# Patient Record
Sex: MALE | Race: OTHER | Employment: OTHER | URBAN - METROPOLITAN AREA
[De-identification: names, ages, dates, MRNs, and addresses within clinical notes are randomized per-mention and may not be internally consistent; named-entity substitution may affect disease eponyms.]

---

## 2018-11-09 ENCOUNTER — HOSPITAL ENCOUNTER (INPATIENT)
Age: 38
LOS: 10 days | Discharge: HOME | DRG: 064 | End: 2018-11-19
Attending: EMERGENCY MEDICINE | Admitting: FAMILY MEDICINE
Payer: SELF-PAY

## 2018-11-09 ENCOUNTER — APPOINTMENT (OUTPATIENT)
Dept: GENERAL RADIOLOGY | Age: 38
DRG: 064 | End: 2018-11-09
Attending: FAMILY MEDICINE
Payer: SELF-PAY

## 2018-11-09 ENCOUNTER — APPOINTMENT (OUTPATIENT)
Dept: CT IMAGING | Age: 38
End: 2018-11-09
Attending: EMERGENCY MEDICINE
Payer: SELF-PAY

## 2018-11-09 ENCOUNTER — HOSPITAL ENCOUNTER (EMERGENCY)
Age: 38
Discharge: OTHER INSTITUTION | End: 2018-11-09
Attending: EMERGENCY MEDICINE
Payer: SELF-PAY

## 2018-11-09 VITALS
OXYGEN SATURATION: 96 % | WEIGHT: 230 LBS | HEART RATE: 75 BPM | TEMPERATURE: 98.5 F | SYSTOLIC BLOOD PRESSURE: 137 MMHG | DIASTOLIC BLOOD PRESSURE: 70 MMHG | RESPIRATION RATE: 17 BRPM

## 2018-11-09 DIAGNOSIS — I61.9 INTRAPARENCHYMAL HEMORRHAGE OF BRAIN (HCC): ICD-10-CM

## 2018-11-09 DIAGNOSIS — R41.82 ALTERED MENTAL STATUS, UNSPECIFIED ALTERED MENTAL STATUS TYPE: Primary | ICD-10-CM

## 2018-11-09 DIAGNOSIS — I61.9 CEREBRAL HEMORRHAGE (HCC): Primary | ICD-10-CM

## 2018-11-09 DIAGNOSIS — F10.939 ALCOHOL WITHDRAWAL SYNDROME WITH COMPLICATION (HCC): ICD-10-CM

## 2018-11-09 DIAGNOSIS — E87.1 HYPONATREMIA: ICD-10-CM

## 2018-11-09 LAB
ABO + RH BLD: NORMAL
ALBUMIN SERPL-MCNC: 3.4 G/DL (ref 3.5–5)
ALBUMIN/GLOB SERPL: 0.5 {RATIO} (ref 1.1–2.2)
ALP SERPL-CCNC: 132 U/L (ref 45–117)
ALT SERPL-CCNC: 33 U/L (ref 12–78)
AMMONIA PLAS-SCNC: 49 UMOL/L
AMPHET UR QL SCN: NEGATIVE
ANION GAP SERPL CALC-SCNC: 6 MMOL/L (ref 5–15)
APPEARANCE UR: ABNORMAL
APTT PPP: 29.9 SEC (ref 22.1–32)
AST SERPL-CCNC: 56 U/L (ref 15–37)
ATRIAL RATE: 72 BPM
BACTERIA URNS QL MICRO: NEGATIVE /HPF
BARBITURATES UR QL SCN: NEGATIVE
BASOPHILS # BLD: 0.1 K/UL (ref 0–0.1)
BASOPHILS NFR BLD: 1 % (ref 0–1)
BENZODIAZ UR QL: NEGATIVE
BILIRUB SERPL-MCNC: 4.3 MG/DL (ref 0.2–1)
BILIRUB UR QL CFM: NEGATIVE
BLOOD GROUP ANTIBODIES SERPL: NORMAL
BUN SERPL-MCNC: 7 MG/DL (ref 6–20)
BUN/CREAT SERPL: 10 (ref 12–20)
CALCIUM SERPL-MCNC: 8.7 MG/DL (ref 8.5–10.1)
CALCULATED P AXIS, ECG09: 54 DEGREES
CALCULATED R AXIS, ECG10: -8 DEGREES
CALCULATED T AXIS, ECG11: 6 DEGREES
CANNABINOIDS UR QL SCN: NEGATIVE
CHLORIDE SERPL-SCNC: 92 MMOL/L (ref 97–108)
CHOLEST SERPL-MCNC: 82 MG/DL
CO2 SERPL-SCNC: 26 MMOL/L (ref 21–32)
COCAINE UR QL SCN: NEGATIVE
COLOR UR: ABNORMAL
COMMENT, HOLDF: NORMAL
CREAT SERPL-MCNC: 0.73 MG/DL (ref 0.7–1.3)
DIAGNOSIS, 93000: NORMAL
DIFFERENTIAL METHOD BLD: ABNORMAL
DRUG SCRN COMMENT,DRGCM: NORMAL
EOSINOPHIL # BLD: 0 K/UL (ref 0–0.4)
EOSINOPHIL NFR BLD: 0 % (ref 0–7)
EPITH CASTS URNS QL MICRO: ABNORMAL /LPF
ERYTHROCYTE [DISTWIDTH] IN BLOOD BY AUTOMATED COUNT: 11.9 % (ref 11.5–14.5)
ETHANOL SERPL-MCNC: <10 MG/DL
GLOBULIN SER CALC-MCNC: 6.2 G/DL (ref 2–4)
GLUCOSE SERPL-MCNC: 122 MG/DL (ref 65–100)
GLUCOSE UR STRIP.AUTO-MCNC: NEGATIVE MG/DL
HCT VFR BLD AUTO: 39.9 % (ref 36.6–50.3)
HDLC SERPL-MCNC: 31 MG/DL
HDLC SERPL: 2.6 {RATIO} (ref 0–5)
HGB BLD-MCNC: 13.9 G/DL (ref 12.1–17)
HGB UR QL STRIP: ABNORMAL
IMM GRANULOCYTES # BLD: 0.1 K/UL (ref 0–0.04)
IMM GRANULOCYTES NFR BLD AUTO: 1 % (ref 0–0.5)
INR PPP: 1.3 (ref 0.9–1.1)
KETONES UR QL STRIP.AUTO: 15 MG/DL
LDLC SERPL CALC-MCNC: 39.6 MG/DL (ref 0–100)
LEUKOCYTE ESTERASE UR QL STRIP.AUTO: ABNORMAL
LIPID PROFILE,FLP: NORMAL
LYMPHOCYTES # BLD: 1.8 K/UL (ref 0.8–3.5)
LYMPHOCYTES NFR BLD: 17 % (ref 12–49)
MAGNESIUM SERPL-MCNC: 2 MG/DL (ref 1.6–2.4)
MCH RBC QN AUTO: 35.5 PG (ref 26–34)
MCHC RBC AUTO-ENTMCNC: 34.8 G/DL (ref 30–36.5)
MCV RBC AUTO: 102 FL (ref 80–99)
METHADONE UR QL: NEGATIVE
MONOCYTES # BLD: 1.6 K/UL (ref 0–1)
MONOCYTES NFR BLD: 15 % (ref 5–13)
MUCOUS THREADS URNS QL MICRO: ABNORMAL /LPF
NEUTS SEG # BLD: 6.9 K/UL (ref 1.8–8)
NEUTS SEG NFR BLD: 66 % (ref 32–75)
NITRITE UR QL STRIP.AUTO: POSITIVE
NRBC # BLD: 0 K/UL (ref 0–0.01)
NRBC BLD-RTO: 0 PER 100 WBC
OPIATES UR QL: NEGATIVE
P-R INTERVAL, ECG05: 160 MS
PCP UR QL: NEGATIVE
PH UR STRIP: 6.5 [PH] (ref 5–8)
PLATELET # BLD AUTO: 39 K/UL (ref 150–400)
POTASSIUM SERPL-SCNC: 3.8 MMOL/L (ref 3.5–5.1)
PROT SERPL-MCNC: 9.6 G/DL (ref 6.4–8.2)
PROT UR STRIP-MCNC: 300 MG/DL
PROTHROMBIN TIME: 12.8 SEC (ref 9–11.1)
Q-T INTERVAL, ECG07: 424 MS
QRS DURATION, ECG06: 114 MS
QTC CALCULATION (BEZET), ECG08: 464 MS
RBC # BLD AUTO: 3.91 M/UL (ref 4.1–5.7)
RBC #/AREA URNS HPF: ABNORMAL /HPF (ref 0–5)
RBC MORPH BLD: ABNORMAL
SAMPLES BEING HELD,HOLD: NORMAL
SODIUM SERPL-SCNC: 124 MMOL/L (ref 136–145)
SP GR UR REFRACTOMETRY: 1.02 (ref 1–1.03)
SPECIMEN EXP DATE BLD: NORMAL
THERAPEUTIC RANGE,PTTT: NORMAL SECS (ref 58–77)
TRIGL SERPL-MCNC: 57 MG/DL (ref ?–150)
UROBILINOGEN UR QL STRIP.AUTO: >8 EU/DL (ref 0.2–1)
VENTRICULAR RATE, ECG03: 72 BPM
VIT B12 SERPL-MCNC: 1694 PG/ML (ref 193–986)
VLDLC SERPL CALC-MCNC: 11.4 MG/DL
WBC # BLD AUTO: 10.5 K/UL (ref 4.1–11.1)
WBC MORPH BLD: ABNORMAL
WBC URNS QL MICRO: ABNORMAL /HPF (ref 0–4)

## 2018-11-09 PROCEDURE — 74011250636 HC RX REV CODE- 250/636: Performed by: EMERGENCY MEDICINE

## 2018-11-09 PROCEDURE — 74011000250 HC RX REV CODE- 250: Performed by: FAMILY MEDICINE

## 2018-11-09 PROCEDURE — HZ2ZZZZ DETOXIFICATION SERVICES FOR SUBSTANCE ABUSE TREATMENT: ICD-10-PCS | Performed by: HOSPITALIST

## 2018-11-09 PROCEDURE — 80307 DRUG TEST PRSMV CHEM ANLYZR: CPT

## 2018-11-09 PROCEDURE — 70450 CT HEAD/BRAIN W/O DYE: CPT

## 2018-11-09 PROCEDURE — 36430 TRANSFUSION BLD/BLD COMPNT: CPT

## 2018-11-09 PROCEDURE — 36415 COLL VENOUS BLD VENIPUNCTURE: CPT

## 2018-11-09 PROCEDURE — 74011000250 HC RX REV CODE- 250: Performed by: EMERGENCY MEDICINE

## 2018-11-09 PROCEDURE — 85730 THROMBOPLASTIN TIME PARTIAL: CPT

## 2018-11-09 PROCEDURE — 81001 URINALYSIS AUTO W/SCOPE: CPT

## 2018-11-09 PROCEDURE — 99285 EMERGENCY DEPT VISIT HI MDM: CPT

## 2018-11-09 PROCEDURE — 87040 BLOOD CULTURE FOR BACTERIA: CPT

## 2018-11-09 PROCEDURE — 65610000006 HC RM INTENSIVE CARE

## 2018-11-09 PROCEDURE — 82140 ASSAY OF AMMONIA: CPT

## 2018-11-09 PROCEDURE — 74011000258 HC RX REV CODE- 258: Performed by: NEUROLOGICAL SURGERY

## 2018-11-09 PROCEDURE — 71045 X-RAY EXAM CHEST 1 VIEW: CPT

## 2018-11-09 PROCEDURE — 80053 COMPREHEN METABOLIC PANEL: CPT

## 2018-11-09 PROCEDURE — P9035 PLATELET PHERES LEUKOREDUCED: HCPCS

## 2018-11-09 PROCEDURE — 86901 BLOOD TYPING SEROLOGIC RH(D): CPT

## 2018-11-09 PROCEDURE — 96374 THER/PROPH/DIAG INJ IV PUSH: CPT

## 2018-11-09 PROCEDURE — 74011250636 HC RX REV CODE- 250/636: Performed by: NEUROLOGICAL SURGERY

## 2018-11-09 PROCEDURE — 74011000250 HC RX REV CODE- 250: Performed by: NEUROLOGICAL SURGERY

## 2018-11-09 PROCEDURE — 82607 VITAMIN B-12: CPT

## 2018-11-09 PROCEDURE — 94761 N-INVAS EAR/PLS OXIMETRY MLT: CPT

## 2018-11-09 PROCEDURE — 96375 TX/PRO/DX INJ NEW DRUG ADDON: CPT

## 2018-11-09 PROCEDURE — 99284 EMERGENCY DEPT VISIT MOD MDM: CPT

## 2018-11-09 PROCEDURE — 93005 ELECTROCARDIOGRAM TRACING: CPT

## 2018-11-09 PROCEDURE — 74011250636 HC RX REV CODE- 250/636: Performed by: FAMILY MEDICINE

## 2018-11-09 PROCEDURE — 80061 LIPID PANEL: CPT

## 2018-11-09 PROCEDURE — 74011000258 HC RX REV CODE- 258: Performed by: FAMILY MEDICINE

## 2018-11-09 PROCEDURE — 96361 HYDRATE IV INFUSION ADD-ON: CPT

## 2018-11-09 PROCEDURE — 85025 COMPLETE CBC W/AUTO DIFF WBC: CPT

## 2018-11-09 PROCEDURE — 83735 ASSAY OF MAGNESIUM: CPT

## 2018-11-09 PROCEDURE — 85610 PROTHROMBIN TIME: CPT

## 2018-11-09 RX ORDER — LORAZEPAM 2 MG/ML
INJECTION INTRAMUSCULAR
Status: DISPENSED
Start: 2018-11-09 | End: 2018-11-10

## 2018-11-09 RX ORDER — LABETALOL HYDROCHLORIDE 5 MG/ML
20 INJECTION, SOLUTION INTRAVENOUS
Status: COMPLETED | OUTPATIENT
Start: 2018-11-09 | End: 2018-11-09

## 2018-11-09 RX ORDER — LORAZEPAM 1 MG/1
2 TABLET ORAL
Status: DISCONTINUED | OUTPATIENT
Start: 2018-11-09 | End: 2018-11-09

## 2018-11-09 RX ORDER — SODIUM CHLORIDE 9 MG/ML
250 INJECTION, SOLUTION INTRAVENOUS AS NEEDED
Status: DISCONTINUED | OUTPATIENT
Start: 2018-11-09 | End: 2018-11-19 | Stop reason: HOSPADM

## 2018-11-09 RX ORDER — ACETAMINOPHEN 325 MG/1
650 TABLET ORAL
Status: DISCONTINUED | OUTPATIENT
Start: 2018-11-09 | End: 2018-11-14

## 2018-11-09 RX ORDER — LORAZEPAM 2 MG/ML
2 INJECTION INTRAMUSCULAR
Status: DISCONTINUED | OUTPATIENT
Start: 2018-11-09 | End: 2018-11-13 | Stop reason: SDUPTHER

## 2018-11-09 RX ORDER — NALOXONE HYDROCHLORIDE 0.4 MG/ML
0.4 INJECTION, SOLUTION INTRAMUSCULAR; INTRAVENOUS; SUBCUTANEOUS AS NEEDED
Status: DISCONTINUED | OUTPATIENT
Start: 2018-11-09 | End: 2018-11-19 | Stop reason: HOSPADM

## 2018-11-09 RX ORDER — LORAZEPAM 1 MG/1
4 TABLET ORAL
Status: DISCONTINUED | OUTPATIENT
Start: 2018-11-09 | End: 2018-11-09

## 2018-11-09 RX ORDER — SODIUM CHLORIDE 9 MG/ML
75 INJECTION, SOLUTION INTRAVENOUS CONTINUOUS
Status: DISCONTINUED | OUTPATIENT
Start: 2018-11-09 | End: 2018-11-15

## 2018-11-09 RX ORDER — SODIUM CHLORIDE 0.9 % (FLUSH) 0.9 %
5 SYRINGE (ML) INJECTION EVERY 8 HOURS
Status: DISCONTINUED | OUTPATIENT
Start: 2018-11-09 | End: 2018-11-19 | Stop reason: HOSPADM

## 2018-11-09 RX ORDER — LORAZEPAM 2 MG/ML
1 INJECTION INTRAMUSCULAR
Status: DISCONTINUED | OUTPATIENT
Start: 2018-11-09 | End: 2018-11-09

## 2018-11-09 RX ORDER — ACETAMINOPHEN 650 MG/1
650 SUPPOSITORY RECTAL
Status: DISCONTINUED | OUTPATIENT
Start: 2018-11-09 | End: 2018-11-14

## 2018-11-09 RX ORDER — ONDANSETRON 2 MG/ML
4 INJECTION INTRAMUSCULAR; INTRAVENOUS
Status: DISCONTINUED | OUTPATIENT
Start: 2018-11-09 | End: 2018-11-19 | Stop reason: HOSPADM

## 2018-11-09 RX ORDER — LORAZEPAM 2 MG/ML
4 INJECTION INTRAMUSCULAR
Status: DISCONTINUED | OUTPATIENT
Start: 2018-11-09 | End: 2018-11-13

## 2018-11-09 RX ORDER — LORAZEPAM 2 MG/ML
2 INJECTION INTRAMUSCULAR
Status: COMPLETED | OUTPATIENT
Start: 2018-11-09 | End: 2018-11-09

## 2018-11-09 RX ADMIN — LORAZEPAM 4 MG: 2 INJECTION INTRAMUSCULAR; INTRAVENOUS at 19:07

## 2018-11-09 RX ADMIN — LORAZEPAM 1 MG: 2 INJECTION INTRAMUSCULAR; INTRAVENOUS at 18:08

## 2018-11-09 RX ADMIN — SODIUM CHLORIDE 0.4 MCG/KG/HR: 900 INJECTION, SOLUTION INTRAVENOUS at 19:19

## 2018-11-09 RX ADMIN — LORAZEPAM 4 MG: 2 INJECTION INTRAMUSCULAR; INTRAVENOUS at 19:39

## 2018-11-09 RX ADMIN — LORAZEPAM 2 MG: 2 INJECTION INTRAMUSCULAR; INTRAVENOUS at 15:17

## 2018-11-09 RX ADMIN — SODIUM CHLORIDE 75 ML/HR: 900 INJECTION, SOLUTION INTRAVENOUS at 17:43

## 2018-11-09 RX ADMIN — LORAZEPAM 2 MG: 2 INJECTION INTRAMUSCULAR; INTRAVENOUS at 18:44

## 2018-11-09 RX ADMIN — LABETALOL HYDROCHLORIDE 20 MG: 5 INJECTION INTRAVENOUS at 13:52

## 2018-11-09 RX ADMIN — Medication 20 ML: at 20:07

## 2018-11-09 RX ADMIN — FAMOTIDINE 20 MG: 10 INJECTION, SOLUTION INTRAVENOUS at 20:05

## 2018-11-09 RX ADMIN — SODIUM CHLORIDE 0.7 MCG/KG/HR: 900 INJECTION, SOLUTION INTRAVENOUS at 22:53

## 2018-11-09 RX ADMIN — FOLIC ACID: 5 INJECTION, SOLUTION INTRAMUSCULAR; INTRAVENOUS; SUBCUTANEOUS at 16:58

## 2018-11-09 RX ADMIN — SODIUM CHLORIDE 5 MG/HR: 900 INJECTION, SOLUTION INTRAVENOUS at 13:57

## 2018-11-09 RX ADMIN — CEFTRIAXONE 1 G: 1 INJECTION, POWDER, FOR SOLUTION INTRAMUSCULAR; INTRAVENOUS at 18:02

## 2018-11-09 RX ADMIN — SODIUM CHLORIDE 1000 ML: 900 INJECTION, SOLUTION INTRAVENOUS at 12:45

## 2018-11-09 RX ADMIN — SODIUM CHLORIDE 500 MG: 900 INJECTION, SOLUTION INTRAVENOUS at 17:35

## 2018-11-09 RX ADMIN — SODIUM CHLORIDE 1.2 MCG/KG/HR: 900 INJECTION, SOLUTION INTRAVENOUS at 21:54

## 2018-11-09 NOTE — H&P
[address]  HISTORY AND PHYSICAL Maricruz Ballesteros 
MR#: [id number] : 1980 ACCOUNT #: [de-identified] ADMIT DATE: 2018 CHIEF COMPLAINT:  Altered mental status. HISTORY OF PRESENT ILLNESS:  The patient is a 60-year-old male with a past medical history of alcohol abuse, who presents to the hospital with the above-mentioned symptoms. Patient is very confused, disoriented, not much history could be obtained from the patient. The  history was obtained primarily from the ER note as well as his sister, who is currently at the bedside. Apparently, per sister, the patient had a heavy amount of alcohol last night. This morning, it was noticed by his roommates that he was not acting right. He was confused, not able to walk, and had a fall in the shower. Post that, the patient became more confused and EMS was called, and the patient was brought to the hospital.  Patient was found to have an intracranial bleed, was requested to be admitted, and was sent to [facility] [address] [place] [address] for further management and evaluation. Per sister, the patient works as a . Neurosurgery was consulted and the patient was requested to be admitted under the hospitalist service. Currently, the patient appears jittery, does not follow much instruction, and appears to be quite tremulous. No further history could be obtained from the sister, ER note from the ER physician, or from the patient. PAST MEDICAL HISTORY:  See above. HOME MEDICATIONS:  Currently, the patient is on no home medications. SOCIAL HISTORY:  Per sister, the patient drinks heavily, especially on the weekends, drinks 4-6 beers on weekdays. No history of IV drug abuse. ALLERGIES:  NO KNOWN DRUG ALLERGIES. FAMILY HISTORY:  Could not be obtained from the patient. REVIEW OF SYSTEMS:  Could not be obtained from the patient.  
 
PHYSICAL EXAMINATION: 
 VITAL SIGNS:  Temperature 100.4, pulse 79, respiratory rate 17, blood pressure 125/85, pulse ox 96% on room air. GENERAL:  Confused, disoriented, tremulous male. HEENT:  Pupils equal and reactive to light. Dry mucous membranes. Tympanic membranes clear. NECK:  Supple. CHEST:  Clear to auscultation bilaterally. CORONARY:  S1 and S2 were heard. ABDOMEN:  Soft, nontender, nondistended. Bowel sounds are physiologic. EXTREMITIES:  No clubbing, no cyanosis, no edema. NEUROPSYCHIATRIC:  Very limited exam.  DTRs 1+/4. Patient able to move all 4, but strength could not be tested. Cranial nerves could not be tested. SKIN:  Warm. LABORATORY DATA:  White count 10.5, hemoglobin 13.9, hematocrit 39.9, MCV is 102, platelets 48,632. Large amount of blood in the urine with 5-10 WBCs and negative bacteria. INR 1.3. Sodium 124, potassium 3.8, chloride 92, bicarbonate 26, anion gap 6, glucose 122, BUN 7, creatinine 2.73, calcium 8.7, magnesium 2.0, bilirubin total 4.3, ALT 33, AST 56, alkaline phosphatase 132, ammonia 49. Urine drug screen is negative. Blood alcohol level was less than 10. CT of the head shows acute left parietal intraparenchymal hemorrhage measuring 5.1 x 3.6 x 6.2 cm . EKG shows normal sinus rhythm with sinus arrhythmia. Minimal voltage criteria for LVH. ASSESSMENT AND PLAN: 
1. Intracranial hemorrhage. The patient will be admitted to the ICU bed. The patient appears to be critically ill and with high risk for decompensation, and this was explained to the sister. We will monitor the patient. We will provide serial neuro checks. Any change in his neurological status will mandate emergent intervention. Hold anticoagulation and antiplatelet. We will repeat a CT scan in the morning. May consider getting an MRI in the morning. Neurosurgery consult has been obtained. Per Neurosurgery note, the patient is not a surgical candidate, but we will await their input.   We will provide supportive care and close monitoring. Further intervention will be per hospital course. Reassess as needed. 2.  Thrombocytopenia, most likely secondary to alcohol. We will transfuse platelets in light of intracranial hemorrhage per the guidelines and closely monitor. Repeat CBC in the morning. Further intervention will be per hospital course. 3.  Fever, unclear etiology. Patient possibly may have a urinary tract infection versus other etiology. Chest x-ray was not obtained in the ER, which has been ordered. We will start the patient empirically on Rocephin. We will get urine culture and blood cultures, and continue to closely monitor. Further intervention will be per hospital course. 4.  Elevated liver enzymes, suspect secondary to alcoholic liver disease. We will get a right upper quadrant ultrasound when the patient is stable. Further intervention will be per hospital course. Continue to closely monitor. Reassess as needed. 5.  Hyponatremia, most likely alcohol related. We will provide normal saline. Currently, the patient is n.p.o. We will repeat labs in the morning, neurovascular checks, and close monitoring. Further intervention will be per hospital course. 6.  Gastrointestinal and deep venous thrombosis prophylaxis. Patient will be on sequential compression devices. Mildred Mitchell MD MM/TAMIA 
D: 11/09/2018 17:05    
T: 11/09/2018 17:49 JOB #: [id number]
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
## 2018-11-09 NOTE — ROUTINE PROCESS
TRANSFER - OUT REPORT: 
 
Verbal report given to Charu WILLIAM(name) on Dede Joseph  being transferred to (unit) for routine progression of care Report consisted of patients Situation, Background, Assessment and  
Recommendations(SBAR). Information from the following report(s) SBAR, ED Summary, Procedure Summary, MAR and Recent Results was reviewed with the receiving nurse. Lines:  
Peripheral IV 11/09/18 Left;Upper Arm (Active) Site Assessment Clean, dry, & intact 11/9/2018  5:39 PM  
Phlebitis Assessment 0 11/9/2018  5:39 PM  
Infiltration Assessment 0 11/9/2018  5:39 PM  
Dressing Status Clean, dry, & intact 11/9/2018  5:39 PM  
   
Peripheral IV 11/09/18 Right; Lower Arm (Active) Site Assessment Clean, dry, & intact 11/9/2018  5:40 PM  
Phlebitis Assessment 0 11/9/2018  5:40 PM  
Infiltration Assessment 0 11/9/2018  5:40 PM  
Dressing Status Clean, dry, & intact 11/9/2018  5:40 PM  
   
Peripheral IV 11/09/18 Left Antecubital (Active) Site Assessment Clean, dry, & intact 11/9/2018  5:40 PM  
Phlebitis Assessment 0 11/9/2018  5:40 PM  
Infiltration Assessment 0 11/9/2018  5:40 PM  
Dressing Status Clean, dry, & intact 11/9/2018  5:40 PM  
  
 
Opportunity for questions and clarification was provided. Patient transported with: 
 Monitor Registered Nurse

## 2018-11-09 NOTE — PROGRESS NOTES
Spiritual Care Assessment/Progress Note [address] 
 
 
NAME: Nael Mckinney      MRN: [id number] AGE: 41 y.o. SEX: male Mormonism Affiliation: Anabaptist Language: Tuvaluan  
 
11/9/2018     Total Time (in minutes): 5 Spiritual Assessment begun in [facility] EMERGENCY DEPT through conversation with: 
  
    [x]Patient        [x] Family    [] Friend(s) Reason for Consult: Request by staff Spiritual beliefs: (Please include comment if needed) [x] Identifies with a bouchra tradition:     
   [] Supported by a bouchra community:        
   [] Claims no spiritual orientation:       
   [] Seeking spiritual identity:            
   [] Adheres to an individual form of spirituality:       
   [] Not able to assess:                   
 
    
Identified resources for coping:  
   [x] Prayer                           
   [] Music                  [] Guided Imagery [x] Family/friends                 [] Pet visits [] Devotional reading                         [] Unknown 
   [] Other:                                        
 
 
Interventions offered during this visit: (See comments for more details) Patient Interventions: Prayer (actual) Family/Friend(s): Affirmation of emotions/emotional suffering, Affirmation of bouchra, Catharsis/review of pertinent events in supportive environment, Iconic (affirming the presence of God/Higher Power), Prayer (actual), Prayer (assurance of) Plan of Care: 
 
 [x] Support spiritual and/or cultural needs  
 [] Support AMD and/or advance care planning process    
 [] Support grieving process 
 [] Coordinate Rites and/or Rituals  
 [] Coordination with community clergy [] No spiritual needs identified at this time 
 [] Detailed Plan of Care below (See Comments)  [] Make referral to Music Therapy 
[] Make referral to Pet Therapy    
[] Make referral to Addiction services 
[] Make referral to [facility] [] Make referral to Spiritual Care Partner 
[] No future visits requested       
[] Follow up visits as needed Comments:  is visiting in response to staff request as pt is being prepped to transfer to [facility].  met with pt and family providing support and prayer. Chaplain Landon Diego M.Div, M.S, [facility] Spiritual Care available at [phone])

## 2018-11-09 NOTE — ED TRIAGE NOTES
uTrail me # [id number] Roommate called sister and stated that the patient was not acting right this am. Pt with H/O ETOH abuse. Pt having trouble walking. Scattered bruising noted. Last ETOH intake unknown.

## 2018-11-09 NOTE — ED PROVIDER NOTES
41 y.o. male with past medical history significant for etoh abuse, who presents from EMS as a transfer for evaluation of fall. Pt has onset this morning of an unwitnessed GLF in the shower. Following, his sister observed him as confused and disoriented. She reports that he \"tried to go outside w/o clothes on. \" Pt was taken to [facility] ED for evaluation: head CT yielded parietal bleeding. S/p arrival to [facility], pt is intoxicated; last drink this morning. There are no other acute medical concerns at this time. Social hx: etoh abuse. Full history, physical exam, and ROS unable to be obtained due to:  AMS. Note written by Kraft Handler, as dictated by An Kruger MD 3:02 PM 
 
 
The history is provided by the patient, a relative and the EMS personnel. History limited by: AMS. No  was used. No past medical history on file. No past surgical history on file. No family history on file. Social History Socioeconomic History  Marital status: SINGLE Spouse name: Not on file  Number of children: Not on file  Years of education: Not on file  Highest education level: Not on file Social Needs  Financial resource strain: Not on file  Food insecurity - worry: Not on file  Food insecurity - inability: Not on file  Transportation needs - medical: Not on file  Transportation needs - non-medical: Not on file Occupational History  Not on file Tobacco Use  Smoking status: Current Some Day Smoker  Smokeless tobacco: Never Used Substance and Sexual Activity  Alcohol use: Yes Alcohol/week: 2.4 oz Types: 4 Cans of beer per week Comment: Every day, amount doubles on weekend  Drug use: No  
 Sexual activity: Not on file Other Topics Concern  Not on file Social History Narrative  Not on file ALLERGIES: Patient has no known allergies. Review of Systems Unable to perform ROS: Mental status change Vitals:  
 11/09/18 1509 BP: 120/71 Pulse: 86 Resp: 18 Temp: 100.4 °F (38 °C) SpO2: 97% Weight: 97.7 kg (215 lb 6.2 oz) Physical Exam  
Constitutional: He appears well-developed and well-nourished. No distress. tremulous HENT:  
Head: Normocephalic and atraumatic. Right Ear: External ear normal.  
Left Ear: External ear normal.  
Nose: Nose normal.  
Mouth/Throat: Oropharynx is clear and moist.  
Eyes: Conjunctivae and EOM are normal. Pupils are equal, round, and reactive to light. No scleral icterus. Neck: Normal range of motion. Neck supple. No JVD present. No tracheal deviation present. No thyromegaly present. Cardiovascular: Normal rate, regular rhythm and normal heart sounds. Exam reveals no gallop and no friction rub. No murmur heard. Pulmonary/Chest: Effort normal and breath sounds normal. No respiratory distress. He has no wheezes. He has no rales. He exhibits no tenderness. Abdominal: Soft. Bowel sounds are normal. He exhibits no distension and no mass. There is no tenderness. There is no rebound and no guarding. Musculoskeletal: Normal range of motion. He exhibits no edema or tenderness. Lymphadenopathy:  
  He has no cervical adenopathy. Neurological: He is alert. He has normal strength. He displays no atrophy and no tremor. No cranial nerve deficit. He exhibits normal muscle tone. Coordination and gait normal.  
Follows some simple commands, others not. Unable to do full neurologic examination due to AMS Skin: Skin is warm and dry. No rash noted. He is not diaphoretic. No erythema. Psychiatric:  
Not able to assess due to AMS Nursing note and vitals reviewed. Note written by aDnyell Yeh, as dictated by Deangelo Peñaloza MD 3:02 PM 
MDM Number of Diagnoses or Management Options Alcohol withdrawal syndrome with complication [facility]):  
Cerebral hemorrhage [facility]):  
Diagnosis management comments: Impression: 81-year-old [facility] with a history of alcohol abuse probably having alcohol withdrawal syndrome, may concern is that he fell in the shower this morning and was found to have a parietal hemorrhage. He was transferred to our emergency department for admission to the hospital as there is no neurosurgical care at [facility]. Plan of care will be according 8 admission process pulseless and neurosurgery. In addition he'll be started on Ativan as he is starting to have alcohol withdrawal type shakes. He is awake he is alert follows a few simple commands is G CS is greater than 8 Critical Care Total time providing critical care: 30-74 minutes (Total critical care time spent exclusive of procedures:  35 minutes ) Procedures CONSULT NOTE: 
3:37 PM Ally Torres MD spoke with Dr. Barron Arthur, Consult for Neurosurgery. Discussed available diagnostic tests and clinical findings. Dr. Barron Arthur accepts pt for admission. 4:27 PM 
Patient is being admitted to the hospital.  The results of their tests and reasons for their admission have been discussed with them and/or available family. They convey agreement and understanding for the need to be admitted and for their admission diagnosis. Pt to be admitted to Hospitalist, Dr Matt Cevallos.

## 2018-11-09 NOTE — ED NOTES
TRANSFER - OUT REPORT: 
 
Verbal report given to Keke Harvey RN(name) on Katerina Cole  being transferred to [facility] ED(unit) for urgent transfer Report consisted of patients Situation, Background, Assessment and  
Recommendations(SBAR). Information from the following report(s) SBAR, Kardex, ED Summary, Intake/Output, MAR, Recent Results and Cardiac Rhythm SR was reviewed with the receiving nurse. Lines:  
Peripheral IV 11/09/18 Left Antecubital (Active) Site Assessment Clean, dry, & intact 11/9/2018 12:39 PM  
Phlebitis Assessment 0 11/9/2018 12:39 PM  
Infiltration Assessment 0 11/9/2018 12:39 PM  
Dressing Status Clean, dry, & intact 11/9/2018 12:39 PM  
Dressing Type Transparent 11/9/2018 12:39 PM  
Hub Color/Line Status Pink 11/9/2018 12:39 PM  
   
Peripheral IV 11/09/18 Right Forearm (Active) Site Assessment Clean, dry, & intact 11/9/2018  1:48 PM  
Phlebitis Assessment 0 11/9/2018  1:48 PM  
Infiltration Assessment 0 11/9/2018  1:48 PM  
Dressing Status Clean, dry, & intact 11/9/2018  1:48 PM  
Dressing Type Tape;Transparent 11/9/2018  1:48 PM  
Hub Color/Line Status Green;Flushed 11/9/2018  1:48 PM  
Action Taken Blood drawn 11/9/2018  1:48 PM  
Alcohol Cap Used Yes 11/9/2018  1:48 PM  
  
 
Opportunity for questions and clarification was provided. Patient transported with: 
 Monitor O2 @ 2 liters [facility] Unit #[id number]

## 2018-11-09 NOTE — ED PROVIDER NOTES
Pt here with sister; she states he's not acting normally - started this morning; drinks a lot of alcohol; not able to walk; not answering questions appropriately; drinks beer; no hospitalizations from drinking; no alcohol today; last drink unknown; no F, HA, N, V. Carleen Sesay MD 
 
 
41 y.o. male with no significant past medical history who presents from home with chief complaint of altered mental status. Pt's relative reports altered mental status after Pt drank a lot of alcohol last night with patient hardly being able to walk and trying to go outside naked. Pt's relative notes the Pt fell in the shower last night. Per relative, Pt was unable to walk or answer questions appropriately this morning. Pt's relative notes Pt has a hx of alcohol abuse for the past 12 years. Pt also has scattered bruising all over his body. Per relative, Pt lives alone. Pt's relative notes the Pt works as a . 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 Pt denies drugs use last night. Pt's relative denies fever, nausea, and vomiting. There are no other acute medical concerns at this time. Social hx: Alcohol use. History reviewed. No pertinent past medical history. History reviewed. No pertinent surgical history.  
 
 
Note written by Danyell Chavez, as dictated by Enrike Chisholm NP 1:22 PM 
 
 
 
 
 
 
The history is provided by the patient and a relative. No past medical history on file. No past surgical history on file. No family history on file. Social History Socioeconomic History  Marital status: Not on file Spouse name: Not on file  Number of children: Not on file  Years of education: Not on file  Highest education level: Not on file Social Needs  Financial resource strain: Not on file  Food insecurity - worry: Not on file  Food insecurity - inability: Not on file  Transportation needs - medical: Not on file  Transportation needs - non-medical: Not on file Occupational History  Not on file Tobacco Use  Smoking status: Not on file Substance and Sexual Activity  Alcohol use: Not on file  Drug use: Not on file  Sexual activity: Not on file Other Topics Concern  Not on file Social History Narrative  Not on file ALLERGIES: Patient has no allergy information on record. Review of Systems Constitutional: Positive for activity change. Negative for chills, diaphoresis, fatigue and fever. HENT: Negative for congestion, ear discharge, ear pain, rhinorrhea, sinus pressure, sinus pain, sore throat, trouble swallowing and voice change. Eyes: Negative for photophobia, pain, redness, itching and visual disturbance. Respiratory: Negative for cough, chest tightness and shortness of breath. Cardiovascular: Negative for chest pain, palpitations and leg swelling. Gastrointestinal: Negative for abdominal distention, abdominal pain, blood in stool, constipation, diarrhea, nausea and vomiting. Endocrine: Negative. Genitourinary: Negative for difficulty urinating, frequency and urgency. Musculoskeletal: Negative for arthralgias, back pain, myalgias, neck pain and neck stiffness.   
Skin: Positive for color change (scattered bruising over bilateral lower extremities. ). Negative for pallor, rash and wound. Allergic/Immunologic: Negative. Neurological: Negative for dizziness, syncope, weakness, light-headedness, numbness and headaches. Hematological: Does not bruise/bleed easily. Psychiatric/Behavioral: Positive for confusion. Negative for behavioral problems. The patient is not nervous/anxious. All other systems reviewed and are negative. There were no vitals filed for this visit. Physical Exam  
Constitutional: He appears well-developed and well-nourished. He appears ill. No distress. HENT:  
Head: Normocephalic and atraumatic. Nose: Nose normal.  
Mouth/Throat: Oropharynx is clear and moist. No oropharyngeal exudate. Eyes: Conjunctivae and EOM are normal. Right eye exhibits no discharge. Left eye exhibits no discharge. No scleral icterus. Yellow sclera. Signs of Jaundice. Neck: Normal range of motion. Neck supple. No JVD present. No tracheal deviation present. No thyromegaly present. Cardiovascular: Normal rate, regular rhythm, normal heart sounds and intact distal pulses. Exam reveals no gallop and no friction rub. No murmur heard. Pulmonary/Chest: Effort normal and breath sounds normal. No stridor. No respiratory distress. He has no wheezes. He has no rales. He exhibits no tenderness. Abdominal: Soft. Bowel sounds are normal. He exhibits no distension and no mass. There is no tenderness. There is no rebound. Musculoskeletal: Normal range of motion. He exhibits no edema or tenderness. Lymphadenopathy:  
  He has no cervical adenopathy. Neurological: He is alert. He has normal strength. He is disoriented. He displays tremor. No cranial nerve deficit. Gait abnormal. Coordination normal. GCS eye subscore is 4. GCS verbal subscore is 4. GCS motor subscore is 6. Skin: Skin is warm and dry. No rash noted. He is not diaphoretic. No erythema. No pallor. Bilateral lower extremity scattered bruises noted Psychiatric: He has a normal mood and affect. His behavior is normal. Judgment and thought content normal.  
Nursing note and vitals reviewed. Note written by Danyell Jean, as dictated by Garima George NP 1:22 PM 
 
 
MDM Number of Diagnoses or Management Options Altered mental status, unspecified altered mental status type: new and requires workup Hyponatremia: new and requires workup Intraparenchymal hemorrhage of brain [facility]): new and requires workup Diagnosis management comments: Plan: 
Transfer to [facility] for [facility], neurosurgery consult and neuro checks. ED to ED transfer, Dr. Tish No accepted. Amount and/or Complexity of Data Reviewed Clinical lab tests: ordered and reviewed Tests in the radiology section of CPT®: ordered and reviewed Discuss the patient with other providers: yes (Discussed plan of care with Dr. Mildred Oleary) Procedures PROGRESS NOTE: 
1:33 PM 
Spoke to call center. Dr. Halle Lyons will call back. CONSULT NOTE: 
1:50 PM Tami Hazel NP spoke with Dr. Tish No, Consult for Emergency Medicine. Discussed available diagnostic tests and clinical findings. Dr. Tish No accepts ED to ED transfer. CONSULT NOTE: 
2:02 PM Garima George NP spoke with Dr. Halle Lyons, Consult for Neurosurgery. Discussed available diagnostic tests and clinical findings. Dr. Halle Lyons will see the Pt on consult. Total critical care time spent exclusive of procedures:  45 min.   Corinne Johns MD

## 2018-11-09 NOTE — PROGRESS NOTES
Admission Medication Reconciliation: 
 
Information obtained from: Family member Significant PMH/Disease States: No past medical history on file. Chief Complaint for this Admission: AMS Allergies:  Patient has no known allergies. Prior to Admission Medications:  
None Comments/Recommendations: Patient's family member has confirmed that the patient is not currently taking any routine medications at this time Janak Danielson, DeseanD
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
## 2018-11-09 NOTE — PROGRESS NOTES
Called by KAI ALY earlier and spoke to the ER MD  I reviewed the CT of the brain  There is a left occipital  Intracerebral hemorrhage. While not a basal ganglia bleed, generally surgery not indicated  Full note to follow

## 2018-11-10 ENCOUNTER — APPOINTMENT (OUTPATIENT)
Dept: CT IMAGING | Age: 38
DRG: 064 | End: 2018-11-10
Attending: FAMILY MEDICINE
Payer: SELF-PAY

## 2018-11-10 LAB
ANION GAP SERPL CALC-SCNC: 9 MMOL/L (ref 5–15)
BUN SERPL-MCNC: 9 MG/DL (ref 6–20)
BUN/CREAT SERPL: 16 (ref 12–20)
CALCIUM SERPL-MCNC: 7.9 MG/DL (ref 8.5–10.1)
CHLORIDE SERPL-SCNC: 101 MMOL/L (ref 97–108)
CO2 SERPL-SCNC: 25 MMOL/L (ref 21–32)
CREAT SERPL-MCNC: 0.56 MG/DL (ref 0.7–1.3)
ERYTHROCYTE [DISTWIDTH] IN BLOOD BY AUTOMATED COUNT: 12.2 % (ref 11.5–14.5)
GLUCOSE SERPL-MCNC: 115 MG/DL (ref 65–100)
HAV IGM SER QL: NONREACTIVE
HBV CORE IGM SER QL: NONREACTIVE
HBV SURFACE AG SER QL: <0.1 INDEX
HBV SURFACE AG SER QL: NEGATIVE
HCT VFR BLD AUTO: 36.3 % (ref 36.6–50.3)
HCV AB SERPL QL IA: NONREACTIVE
HCV COMMENT,HCGAC: NORMAL
HGB BLD-MCNC: 12.6 G/DL (ref 12.1–17)
MCH RBC QN AUTO: 37.2 PG (ref 26–34)
MCHC RBC AUTO-ENTMCNC: 34.7 G/DL (ref 30–36.5)
MCV RBC AUTO: 107.1 FL (ref 80–99)
NRBC # BLD: 0 K/UL (ref 0–0.01)
NRBC BLD-RTO: 0 PER 100 WBC
PLATELET # BLD AUTO: 64 K/UL (ref 150–400)
PMV BLD AUTO: 12.8 FL (ref 8.9–12.9)
POTASSIUM SERPL-SCNC: 3.8 MMOL/L (ref 3.5–5.1)
RBC # BLD AUTO: 3.39 M/UL (ref 4.1–5.7)
SODIUM SERPL-SCNC: 135 MMOL/L (ref 136–145)
SP1: NORMAL
SP2: NORMAL
SP3: NORMAL
WBC # BLD AUTO: 9.9 K/UL (ref 4.1–11.1)

## 2018-11-10 PROCEDURE — 74011000258 HC RX REV CODE- 258: Performed by: FAMILY MEDICINE

## 2018-11-10 PROCEDURE — 74011000250 HC RX REV CODE- 250: Performed by: FAMILY MEDICINE

## 2018-11-10 PROCEDURE — 80074 ACUTE HEPATITIS PANEL: CPT

## 2018-11-10 PROCEDURE — 65610000006 HC RM INTENSIVE CARE

## 2018-11-10 PROCEDURE — 80048 BASIC METABOLIC PNL TOTAL CA: CPT

## 2018-11-10 PROCEDURE — 74011250636 HC RX REV CODE- 250/636: Performed by: FAMILY MEDICINE

## 2018-11-10 PROCEDURE — 74011250636 HC RX REV CODE- 250/636: Performed by: NEUROLOGICAL SURGERY

## 2018-11-10 PROCEDURE — 74011000258 HC RX REV CODE- 258: Performed by: NEUROLOGICAL SURGERY

## 2018-11-10 PROCEDURE — 85027 COMPLETE CBC AUTOMATED: CPT

## 2018-11-10 PROCEDURE — 36415 COLL VENOUS BLD VENIPUNCTURE: CPT

## 2018-11-10 PROCEDURE — 36430 TRANSFUSION BLD/BLD COMPNT: CPT

## 2018-11-10 PROCEDURE — P9035 PLATELET PHERES LEUKOREDUCED: HCPCS

## 2018-11-10 PROCEDURE — 70450 CT HEAD/BRAIN W/O DYE: CPT

## 2018-11-10 PROCEDURE — 74011000250 HC RX REV CODE- 250: Performed by: NEUROLOGICAL SURGERY

## 2018-11-10 PROCEDURE — 93306 TTE W/DOPPLER COMPLETE: CPT

## 2018-11-10 RX ADMIN — LORAZEPAM 2 MG: 2 INJECTION INTRAMUSCULAR; INTRAVENOUS at 05:14

## 2018-11-10 RX ADMIN — SODIUM CHLORIDE 0.8 MCG/KG/HR: 900 INJECTION, SOLUTION INTRAVENOUS at 09:10

## 2018-11-10 RX ADMIN — LORAZEPAM 2 MG: 2 INJECTION INTRAMUSCULAR; INTRAVENOUS at 21:29

## 2018-11-10 RX ADMIN — SODIUM CHLORIDE 500 MG: 900 INJECTION, SOLUTION INTRAVENOUS at 05:00

## 2018-11-10 RX ADMIN — LORAZEPAM 4 MG: 2 INJECTION INTRAMUSCULAR; INTRAVENOUS at 18:11

## 2018-11-10 RX ADMIN — SODIUM CHLORIDE 0.8 MCG/KG/HR: 900 INJECTION, SOLUTION INTRAVENOUS at 18:34

## 2018-11-10 RX ADMIN — SODIUM CHLORIDE 500 MG: 900 INJECTION, SOLUTION INTRAVENOUS at 19:11

## 2018-11-10 RX ADMIN — Medication 20 ML: at 05:00

## 2018-11-10 RX ADMIN — LORAZEPAM 2 MG: 2 INJECTION INTRAMUSCULAR; INTRAVENOUS at 08:21

## 2018-11-10 RX ADMIN — Medication 20 ML: at 21:35

## 2018-11-10 RX ADMIN — LORAZEPAM 4 MG: 2 INJECTION INTRAMUSCULAR; INTRAVENOUS at 09:19

## 2018-11-10 RX ADMIN — SODIUM CHLORIDE 0.8 MCG/KG/HR: 900 INJECTION, SOLUTION INTRAVENOUS at 23:39

## 2018-11-10 RX ADMIN — SODIUM CHLORIDE 0.6 MCG/KG/HR: 900 INJECTION, SOLUTION INTRAVENOUS at 03:23

## 2018-11-10 RX ADMIN — CEFTRIAXONE 1 G: 1 INJECTION, POWDER, FOR SOLUTION INTRAMUSCULAR; INTRAVENOUS at 18:10

## 2018-11-10 RX ADMIN — SODIUM CHLORIDE 75 ML/HR: 900 INJECTION, SOLUTION INTRAVENOUS at 22:00

## 2018-11-10 RX ADMIN — FAMOTIDINE 20 MG: 10 INJECTION, SOLUTION INTRAVENOUS at 08:21

## 2018-11-10 RX ADMIN — SODIUM CHLORIDE 0.8 MCG/KG/HR: 900 INJECTION, SOLUTION INTRAVENOUS at 13:52

## 2018-11-10 RX ADMIN — FOLIC ACID: 5 INJECTION, SOLUTION INTRAMUSCULAR; INTRAVENOUS; SUBCUTANEOUS at 08:33

## 2018-11-10 RX ADMIN — Medication 5 ML: at 14:00

## 2018-11-10 NOTE — CONSULTS
[address] [facility] Ankush Davila  MR#: [id number]  : 1980  ACCOUNT #: [de-identified]   DATE OF SERVICE: 11/10/2018    REQUESTING PHYSICIAN:  Gene Brooks MD     CHIEF COMPLAINT:  A 50-year-old man with acute left occipital intracerebral hemorrhage. HISTORY OF PRESENT ILLNESS:  A 50-year-old man with a history of alcohol abuse, presents to the hospital confused, disoriented, apparently having fallen in the shower last night. Unclear whether or not he was drinking immediately prior to that, but blood alcohol level was rather low. My suspicion based on the imaging studies is that he had a hemorrhage first and then fell in the shower. PAST MEDICAL HISTORY:  As noted above. HOME MEDICATIONS:  None. SOCIAL HISTORY:  No history of IV drug use. Positive smoking history in addition to the alcohol use. ALLERGIES:  NO KNOWN DRUG ALLERGIES. REVIEW OF SYSTEMS:  From the medical record, no other GI, , respiratory, cardiac, endocrinologic, psychiatric, neurologic complaints. PHYSICAL EXAMINATION:  GENERAL:  Right now he is mildly sedated. VITAL SIGNS:  Temperature 99, blood pressure 133/88, respiratory rate 19, pulse rate 63. NEURO:  Toes are downgoing. No clonus at the ankles. He is not responsive. Pupils equal and reactive. IMAGING DATA:  Include a CT scan which shows a fairly sizable occipital lobe hemorrhage, but he has a significant amount of cerebral atrophy, so there is a lot of room in the head, so there is no real mass effect as a result. It is unclear whether or not there is any underlying mass in this clot. He might benefit from a CT angiogram; however, he has received 2 CT scans already in the last 24 hours, so I will order an MRI/MRA on the off chance that there may be some vascular mass lesion underneath the clot. This is not a subarachnoid hemorrhage, so I do not believe this is related to an aneurysmal hemorrhage.     PLAN: No surgical intervention necessary. We will go ahead and continue to follow closely. MRI/MRA as noted above. Limit sedation as necessary and [facility] protocol for alcohol abuse. I have discussed this with the nursing staff. We will follow as needed.       MD ROSINA Asencio / MIKEY  D: 11/10/2018 10:36     T: 11/10/2018 10:47  JOB #: [id number]  CC: Rosa Maria Stokes MD

## 2018-11-10 NOTE — PROGRESS NOTES
Left occipital hemorrhage  Sedated currently  SYBIL  On CIWA protocol appropriately, @ CTs oh head done, might be some benefit in seeing if there is an underlying vascular mass under the clot, will order MRI/MRA of brain  Continue supportive care  Full note dictated

## 2018-11-10 NOTE — PROGRESS NOTES
1930-bedside shift report received from [address] [place] [address] using sbar format/alarms checked-pt combative/yelling out/tremulous/+ visual and auditory hallucinations-pt sister at bedside and stated pt speaks english though now only speaking Occitan and not making any sense in Occitan/speech is \"gibberish\" following no commands/ DIEHL 
-bilat wrist rest  Placed by Melanie Hartman RN/pt incont of lg amount of parris urine/yung placed and pt bathed 2030-pt calming/precedex infusing at 1.2mcg 2045-pt sedated though easily agitates with any stimulation-sister to remain at bedside overnight recliner and linen provided-sister requested that only immediate family visit/sign placed on door. Sister is aware that pt is not a surgical candidate. Sister Jethro Spivey made aware that his condition could very well deteriorate in the next 12-48hrs causing difficulty breathing and maintaining his airway and will need support to breath -sister wants pt intubated if needed 2120-blood consent obtained from pt's sister Jethro Spivey 2213-1st unit plts started at 75cc/hr 2217- no w/d to pain/pupils 2 bilat and sluggish  precedex dec to . 8mcg- 
2232-no s/s of transfusion reaction. Transfusion rate inc to 600ml/hr 
2253-precedex dec to . 7mcg 
2310-1st unit plt completed w/o incident 2325-2nd unit plt started at 75cc/hr 2344-no s/s of transfusion reaction. Transfusion rate inc to 600ml/hr 0010-2nd unit plt completed w/o incident 0020-3rd unit plt started at 75cc/hr 0035-no s/s of transfusion reaction. Transfusion rate inc to 600ml/hr 
0120-3rd unit plt completed w/o incident 
0129-precedex dec to . 6mcg 
0400-clearing throat and spitting thick tan secretions into the air-mask placed on pt 
0514- tremulous/agitates  with any stimulation 2mg ativan given prior to transport to CT 
3826-9790-gjszhdohptc to/from CT on cardiac monitor w/o incident-per sister speech is clearer though he thinks he is at work. 0730-bedside shift report given to [facility]

## 2018-11-10 NOTE — PROGRESS NOTES
1845. Patient arrived on unit. Disoriented x4, thrashing in bed trying to flip over side rails and combative. MD paged restraints ordered 1855. Spoke with Neuro sx regarding CIWA scale. Ciwa protocol/meds ordered. precedex order also received. CIWA 29 
1907. No change in patient condition. 4mg Ativan given. precedex gtt started 
1939. Patient becoming more combative and agitated. CIWA 32. Precedex gtt increased to 1mcg/kg Bedside and Verbal shift change report given to Miguel Iverson RN by Abimael Martinez RN. Report included the following information SBAR.

## 2018-11-10 NOTE — PROGRESS NOTES
Hospitalist Progress Note Ky Guerra MD 
     
                             Answering service: [phone] OR [phone] from in house phone Date of Service:  11/10/2018 NAME:  Hakan Giron 
:  1980 MRN:  [id number] Admission Summary:  
Patient is a 41year old  man who is heavy alcoholic brought to the ER for confusion. He had fallen the night prior. He was admitted to the ICU for ICH and alcohol withdrawal. 
 
Reason for follow up:  
Sedated. Soft wrist restraints in place. His sister in the room who says he drinks alcohol daily. Assessment & Plan:  
Encephalopathy due to raumatic acute left parietal intraparenchymal hemorrhage  
-Repeat CT with ICH stable. He is sedated 
-MRA H&N pending 
-Neurosurgery following. -IV keppra Alcohol withdrawal 
-On CIWA protocol. Goodie bag. Soft wrist restraints. Thrombocytopenia 
-Due likely to ETOH. Monitor Hyponatremia due to alcohol: improved. Traumatic hematuria;cleairng Diet:npo Code status: full DVT prophylaxis: scd/ppi Care Plan discussed with:nurse,sister at bedside. Hospital Problems  Date Reviewed: 2018 Codes Class Noted POA * (Principal) ICH (intracerebral hemorrhage) ([facility] [address] ICD-10-CM: I61.9 ICD-9-CM: 458  2018 Unknown Review of Systems:  
Review of systems not obtained due to patient factors. Physical Examination:  
 
 Last 24hrs VS reviewed since prior progress note. Most recent are: 
Visit Vitals BP (!) 127/94 Pulse 62 Temp 98.4 °F (36.9 °C) Resp 17 Ht 5' 8\" (1.727 m) Wt 88.5 kg (195 lb 1.7 oz) SpO2 97% BMI 29.67 kg/m² Constitutional:  sedated. HEENT: Head is a traumatic, Un icteric sclera. Pink conjunctiva,no erythema or discharge. Oral mucous moist, oropharynx benign.  Neck supple,   
 Resp: CTA bilaterally. No wheezing/rhonchi/rales. No accessory muscle use CV:  Regular rhythm, normal rate, no murmurs, gallops, rubs GI:  Soft, non distended, non tender. normoactive bowel sounds, no hepatosplenomegaly :  No CVA or suprapubic tenderness Skin  :  No erythema,rash,bullae,dipigmentation Musculoskeletal:  bilateral soft restraints. Neurologic:  Sedated,CN II-XII reviewed. Moves all extremities. Intake/Output Summary (Last 24 hours) at 11/10/2018 1801 Last data filed at 11/10/2018 1200 Gross per 24 hour Intake 3002.23 ml Output 3520 ml Net -517.77 ml Data Review:  
 Review and/or order of clinical lab test 
Review and/or order of tests in the radiology section of CPT Review and/or order of tests in the medicine section of CPT Labs:  
 
Recent Labs 11/10/18 
0446 11/09/18 
1239 WBC 9.9 10.5 HGB 12.6 13.9 HCT 36.3* 39.9 PLT 64* 39* Recent Labs 11/10/18 
0446 11/09/18 
1239 * 124* K 3.8 3.8  92* CO2 25 26 BUN 9 7  
CREA 0.56* 0.73 * 122* CA 7.9* 8.7 MG  --  2.0 Recent Labs 11/09/18 
1239 SGOT 56* ALT 33 * TBILI 4.3* TP 9.6* ALB 3.4*  
GLOB 6.2* Recent Labs 11/09/18 
1239 INR 1.3* PTP 12.8* APTT 29.9 No results for input(s): FE, TIBC, PSAT, FERR in the last 72 hours. No results found for: FOL, RBCF No results for input(s): PH, PCO2, PO2 in the last 72 hours. No results for input(s): CPK, CKNDX, TROIQ in the last 72 hours. No lab exists for component: CPKMB Lab Results Component Value Date/Time Cholesterol, total 82 11/09/2018 05:23 PM  
 HDL Cholesterol 31 11/09/2018 05:23 PM  
 LDL, calculated 39.6 11/09/2018 05:23 PM  
 Triglyceride 57 11/09/2018 05:23 PM  
 CHOL/HDL Ratio 2.6 11/09/2018 05:23 PM  
 
No results found for: Catherine Asp Lab Results Component Value Date/Time  Color ORANGE 11/09/2018 01:07 PM  
 Appearance CLOUDY (A) 11/09/2018 01:07 PM  
 Specific gravity 1.025 11/09/2018 01:07 PM  
 pH (UA) 6.5 11/09/2018 01:07 PM  
 Protein 300 (A) 11/09/2018 01:07 PM  
 Glucose NEGATIVE  11/09/2018 01:07 PM  
 Ketone 15 (A) 11/09/2018 01:07 PM  
 Urobilinogen >8.0 (H) 11/09/2018 01:07 PM  
 Nitrites POSITIVE (A) 11/09/2018 01:07 PM  
 Leukocyte Esterase SMALL (A) 11/09/2018 01:07 PM  
 Epithelial cells FEW 11/09/2018 01:07 PM  
 Bacteria NEGATIVE  11/09/2018 01:07 PM  
 WBC 5-10 11/09/2018 01:07 PM  
 RBC 20-50 11/09/2018 01:07 PM  
 
 
 
Medications Reviewed:  
 
Current Facility-Administered Medications Medication Dose Route Frequency  ondansetron (ZOFRAN) injection 4 mg  4 mg IntraVENous Q6H PRN  
 naloxone (NARCAN) injection 0.4 mg  0.4 mg IntraVENous PRN  
 acetaminophen (TYLENOL) tablet 650 mg  650 mg Oral Q4H PRN Or  
 acetaminophen (TYLENOL) solution 650 mg  650 mg Per NG tube Q4H PRN Or  
 acetaminophen (TYLENOL) suppository 650 mg  650 mg Rectal Q4H PRN  
 0.9% sodium chloride infusion  75 mL/hr IntraVENous CONTINUOUS  
 levETIRAcetam (KEPPRA) 500 mg in 0.9% sodium chloride 100 mL IVPB  500 mg IntraVENous Q12H  
 famotidine (PF) (PEPCID) 20 mg in sodium chloride 0.9% 10 mL injection  20 mg IntraVENous Q12H  
 0.9% sodium chloride 2,508 mL with folic acid 1 mg, thiamine 100 mg, mvi, adult no. 4 with vit K 10 mL infusion   IntraVENous DAILY  0.9% sodium chloride infusion 250 mL  250 mL IntraVENous PRN  
 cefTRIAXone (ROCEPHIN) 1 g in 0.9% sodium chloride (MBP/ADV) 50 mL  1 g IntraVENous Q24H  
 LORazepam (ATIVAN) injection 2 mg  2 mg IntraVENous Q2H PRN  
 LORazepam (ATIVAN) injection 4 mg  4 mg IntraVENous Q1H PRN  
 LORazepam (ATIVAN) injection 2 mg  2 mg IntraVENous Q1H PRN  
 dexmedeTOMidine (PRECEDEX) 400 mcg in 0.9% sodium chloride 100 mL infusion  0.2-1.2 mcg/kg/hr IntraVENous TITRATE  SALINE PERIPHERAL FLUSH Q8H soln 5 mL  5 mL InterCATHeter Q8H  
 
 ______________________________________________________________________ EXPECTED LENGTH OF STAY: - - - 
ACTUAL LENGTH OF STAY:          1 Kimberlyn Johnston MD

## 2018-11-10 NOTE — ROUTINE PROCESS
TRANSFER - IN REPORT: 
 
Verbal report received on Abhinav Laurent  being received from ED for routine progression of care Report consisted of patients Situation, Background, Assessment and  
Recommendations(SBAR). Information from the following report(s) SBAR was reviewed with the receiving nurse. Opportunity for questions and clarification was provided. Assessment completed upon patients arrival to unit and care assumed.

## 2018-11-10 NOTE — PROGRESS NOTES
Marcio Tapia / Pulmonary / Critical Care Assessment / Plan:   
 
Intracranial hemorrhage EtOH abuse Thrombocytopenia 2/2 to EtOH abuse Low grade fever - started on rocephin 
 
--monitor in ICU 
--BP control 
--Keppra 
--monitor for EtOH withdrawal (CIWA / precedex) 
--goodie bag 
--electrolytes as needed 
--on rocephin empirically --pepcid / SCD's 
--NS following History / Subjective: 
Hospital Day: 2 - Interval history and notes reviewed 42 yo EtOH abuse confused after fall. Found to have  [address]. Admitted to ICU Confused Started on precedex and keppra Seen by NS but no indication for surgery currently. Started on goodie bag and being monitored for EtOH withdrawal - CIWA protocol Objective: 
Patient Vitals for the past 4 hrs: 
 BP Temp Pulse Resp SpO2 Weight 11/10/18 0900 133/88  63 19 96 %   
11/10/18 0800 120/75 99.5 °F (37.5 °C) 65 19 93 %   
11/10/18 0715 113/71  66 19 95 %   
11/10/18 0700 118/79  70 20 96 % 88.5 kg (195 lb 1.7 oz)  
11/10/18 0645 128/81  71 20 96 %   
11/10/18 0630 113/73  71 19 95 %   
11/10/18 0615 125/77  68 20 95 %   
11/10/18 0600 123/84  67 19 96 %  Temp (24hrs), Av.6 °F (37 °C), Min:97.5 °F (36.4 °C), Max:100.4 °F (38 °C) Intake/Output Summary (Last 24 hours) at 11/10/2018 7914 Last data filed at 11/10/2018 0900 Gross per 24 hour Intake 2819.53 ml Output 3070 ml Net -250.47 ml No results found for: Newsy Exam: 
Appears comfortable NAD Data:  
Interval lab and diagnostic data was reviewed. Interval radiology images were independently viewed and available reports were reviewed. CXR - no acute process Head CT - no change in 2.8 x 5.6 cm parenchymal hemorrhage in L parietal / occiptal lobs with vasogenic edema. Minimal 3 mm shift to right Lab: 
Recent Labs 11/10/18 
0446 18 
1239 WBC 9.9 10.5 HGB 12.6 13.9 PLT 64* 39* * 124* K 3.8 3.8  92* CO2 25 26 BUN 9 7  
CREA 0.56* 0.73  
 * 122* CA 7.9* 8.7 MG  --  2.0 INR  --  1.3* TBILI  --  4.3* SGOT  --  56* Tarun Paniagua MD

## 2018-11-11 LAB
BLD PROD TYP BPU: NORMAL
BPU ID: NORMAL
STATUS OF UNIT,%ST: NORMAL
UNIT DIVISION, %UDIV: 0

## 2018-11-11 PROCEDURE — 74011250636 HC RX REV CODE- 250/636: Performed by: FAMILY MEDICINE

## 2018-11-11 PROCEDURE — 65610000006 HC RM INTENSIVE CARE

## 2018-11-11 PROCEDURE — 74011250636 HC RX REV CODE- 250/636: Performed by: INTERNAL MEDICINE

## 2018-11-11 PROCEDURE — 74011250636 HC RX REV CODE- 250/636: Performed by: NEUROLOGICAL SURGERY

## 2018-11-11 PROCEDURE — 74011000250 HC RX REV CODE- 250: Performed by: NEUROLOGICAL SURGERY

## 2018-11-11 PROCEDURE — 74011000250 HC RX REV CODE- 250: Performed by: FAMILY MEDICINE

## 2018-11-11 PROCEDURE — 74011000258 HC RX REV CODE- 258: Performed by: FAMILY MEDICINE

## 2018-11-11 PROCEDURE — 74011000258 HC RX REV CODE- 258: Performed by: NEUROLOGICAL SURGERY

## 2018-11-11 RX ORDER — LABETALOL HYDROCHLORIDE 5 MG/ML
20 INJECTION, SOLUTION INTRAVENOUS
Status: DISCONTINUED | OUTPATIENT
Start: 2018-11-11 | End: 2018-11-14

## 2018-11-11 RX ORDER — HYDRALAZINE HYDROCHLORIDE 20 MG/ML
20 INJECTION INTRAMUSCULAR; INTRAVENOUS
Status: DISCONTINUED | OUTPATIENT
Start: 2018-11-11 | End: 2018-11-11

## 2018-11-11 RX ORDER — HYDRALAZINE HYDROCHLORIDE 20 MG/ML
20 INJECTION INTRAMUSCULAR; INTRAVENOUS
Status: DISCONTINUED | OUTPATIENT
Start: 2018-11-11 | End: 2018-11-14

## 2018-11-11 RX ADMIN — HYDRALAZINE HYDROCHLORIDE 20 MG: 20 INJECTION INTRAMUSCULAR; INTRAVENOUS at 12:34

## 2018-11-11 RX ADMIN — SODIUM CHLORIDE 75 ML/HR: 900 INJECTION, SOLUTION INTRAVENOUS at 22:02

## 2018-11-11 RX ADMIN — SODIUM CHLORIDE 500 MG: 900 INJECTION, SOLUTION INTRAVENOUS at 18:48

## 2018-11-11 RX ADMIN — LORAZEPAM 2 MG: 2 INJECTION INTRAMUSCULAR; INTRAVENOUS at 08:43

## 2018-11-11 RX ADMIN — FAMOTIDINE 20 MG: 10 INJECTION, SOLUTION INTRAVENOUS at 08:43

## 2018-11-11 RX ADMIN — SODIUM CHLORIDE 0.8 MCG/KG/HR: 900 INJECTION, SOLUTION INTRAVENOUS at 16:03

## 2018-11-11 RX ADMIN — SODIUM CHLORIDE 0.7 MCG/KG/HR: 900 INJECTION, SOLUTION INTRAVENOUS at 22:02

## 2018-11-11 RX ADMIN — Medication 5 ML: at 14:31

## 2018-11-11 RX ADMIN — SODIUM CHLORIDE 0.8 MCG/KG/HR: 900 INJECTION, SOLUTION INTRAVENOUS at 04:28

## 2018-11-11 RX ADMIN — SODIUM CHLORIDE 500 MG: 900 INJECTION, SOLUTION INTRAVENOUS at 06:15

## 2018-11-11 RX ADMIN — FAMOTIDINE 20 MG: 10 INJECTION, SOLUTION INTRAVENOUS at 19:43

## 2018-11-11 RX ADMIN — Medication 20 ML: at 22:03

## 2018-11-11 RX ADMIN — LORAZEPAM 4 MG: 2 INJECTION INTRAMUSCULAR; INTRAVENOUS at 17:49

## 2018-11-11 RX ADMIN — CEFTRIAXONE 1 G: 1 INJECTION, POWDER, FOR SOLUTION INTRAMUSCULAR; INTRAVENOUS at 17:50

## 2018-11-11 RX ADMIN — Medication 20 ML: at 06:14

## 2018-11-11 RX ADMIN — FOLIC ACID: 5 INJECTION, SOLUTION INTRAMUSCULAR; INTRAVENOUS; SUBCUTANEOUS at 08:44

## 2018-11-11 RX ADMIN — SODIUM CHLORIDE 0.8 MCG/KG/HR: 900 INJECTION, SOLUTION INTRAVENOUS at 10:16

## 2018-11-11 NOTE — PROGRESS NOTES
1930-bedside shift report received from [address] [place] [address] using sbar format/alarms checked 2129-CIWA 18-pt restless/pulling against restraints and kicking legs-talking-sister states pt thinks he is with his friends/he is talking to them ( in [address]) precedex at .8mc-ativan 2mg iv given-sister stated pt's speech is garbled and difficult to understand at times 2229-CIWA 6 after ativan 
0000-sister has gone home for the night and will return tomorrow 0230-pt spitting thick tan secretions into the air and onto gown and siderail/mask placed on pt 
 
0730-bedside shift report given  to [employer]

## 2018-11-11 NOTE — PROGRESS NOTES
Consult received and chart reviewed. Spoke with nsg and patient remains on precedex d/t confusion and ETOH withdrawal. Will defer for today and follow up tomorrow as appropriate. Thank you, Ann Hough, PT, DPT

## 2018-11-11 NOTE — PROGRESS NOTES
Hospitalist Progress Note Eder Alvarez MD 
     
                             Answering service: [phone] OR [phone] from in house phone Date of Service:  2018 NAME:  Randi Giron 
:  1980 MRN:  [id number] Admission Summary:  
Patient is a 41year old  man who is heavy alcoholic brought to the ER for confusion. He had fallen the night prior. He was admitted to the ICU for ICH and alcohol withdrawal. 
 
Reason for follow up:  
Sedated. He is trying to get up and get out of bed. Soft wrist restraints in place. His sister in the room . Assessment & Plan:  
Encephalopathy due to raumatic acute left parietal intraparenchymal hemorrhage  
-Repeat CT with ICH stable. -MRA H&N pending 
-Neurosurgery following. -IV keppra Alcohol withdrawal 
-On CIWA protocol. Goodie bag. Soft wrist restraints. Thrombocytopenia 
-Due likely to ETOH. Monitor Hyponatremia due to alcohol: improved. Traumatic hematuria;clearing Diet:npo Code status: full DVT prophylaxis: scd/ppi Care Plan discussed with:nurse,sister at bedside. Hospital Problems  Date Reviewed: 2018 Codes Class Noted POA * (Principal) ICH (intracerebral hemorrhage) ([facility] [address] ICD-10-CM: I61.9 ICD-9-CM: 178  2018 Unknown Review of Systems:  
Review of systems not obtained due to patient factors. Physical Examination:  
 
 Last 24hrs VS reviewed since prior progress note. Most recent are: 
Visit Vitals /85 Pulse 62 Temp 98.8 °F (37.1 °C) Resp 19 Ht 5' 8\" (1.727 m) Wt 88.7 kg (195 lb 8.8 oz) SpO2 98% BMI 29.73 kg/m² Constitutional:  Intermittently agitated. trying to get out of bed HEENT: Head is a traumatic, Un icteric sclera. Pink conjunctiva,no erythema or discharge. Oral mucous moist, oropharynx benign. Neck supple, Resp:  CTA bilaterally. No wheezing/rhonchi/rales. No accessory muscle use CV:  Regular rhythm, normal rate, no murmurs, gallops, rubs GI:  Soft, non distended, non tender. normoactive bowel sounds, no hepatosplenomegaly :  No CVA or suprapubic tenderness Skin  :  No erythema,rash,bullae,dipigmentation Musculoskeletal:  bilateral soft restraints. Neurologic:  Intermittently agitated while on sedation,CN II-XII reviewed. Moves all extremities. Intake/Output Summary (Last 24 hours) at 11/11/2018 6004 Last data filed at 11/11/2018 0700 Gross per 24 hour Intake 2128.2 ml Output 2265 ml Net -136.8 ml Data Review:  
 Review and/or order of clinical lab test 
Review and/or order of tests in the radiology section of CPT Review and/or order of tests in the medicine section of CPT Labs:  
 
Recent Labs 11/10/18 
0446 11/09/18 
1239 WBC 9.9 10.5 HGB 12.6 13.9 HCT 36.3* 39.9 PLT 64* 39* Recent Labs 11/10/18 
0446 11/09/18 
1239 * 124* K 3.8 3.8  92* CO2 25 26 BUN 9 7  
CREA 0.56* 0.73 * 122* CA 7.9* 8.7 MG  --  2.0 Recent Labs 11/09/18 
1239 SGOT 56* ALT 33 * TBILI 4.3* TP 9.6* ALB 3.4*  
GLOB 6.2* Recent Labs 11/09/18 
1239 INR 1.3* PTP 12.8* APTT 29.9 No results for input(s): FE, TIBC, PSAT, FERR in the last 72 hours. No results found for: FOL, RBCF No results for input(s): PH, PCO2, PO2 in the last 72 hours. No results for input(s): CPK, CKNDX, TROIQ in the last 72 hours. No lab exists for component: CPKMB Lab Results Component Value Date/Time Cholesterol, total 82 11/09/2018 05:23 PM  
 HDL Cholesterol 31 11/09/2018 05:23 PM  
 LDL, calculated 39.6 11/09/2018 05:23 PM  
 Triglyceride 57 11/09/2018 05:23 PM  
 CHOL/HDL Ratio 2.6 11/09/2018 05:23 PM  
 
No results found for: Pablo Byrne Lab Results Component Value Date/Time Color ORANGE 11/09/2018 01:07 PM  
 Appearance CLOUDY (A) 11/09/2018 01:07 PM  
 Specific gravity 1.025 11/09/2018 01:07 PM  
 pH (UA) 6.5 11/09/2018 01:07 PM  
 Protein 300 (A) 11/09/2018 01:07 PM  
 Glucose NEGATIVE  11/09/2018 01:07 PM  
 Ketone 15 (A) 11/09/2018 01:07 PM  
 Urobilinogen >8.0 (H) 11/09/2018 01:07 PM  
 Nitrites POSITIVE (A) 11/09/2018 01:07 PM  
 Leukocyte Esterase SMALL (A) 11/09/2018 01:07 PM  
 Epithelial cells FEW 11/09/2018 01:07 PM  
 Bacteria NEGATIVE  11/09/2018 01:07 PM  
 WBC 5-10 11/09/2018 01:07 PM  
 RBC 20-50 11/09/2018 01:07 PM  
 
 
 
Medications Reviewed:  
 
Current Facility-Administered Medications Medication Dose Route Frequency  ondansetron (ZOFRAN) injection 4 mg  4 mg IntraVENous Q6H PRN  
 naloxone (NARCAN) injection 0.4 mg  0.4 mg IntraVENous PRN  
 acetaminophen (TYLENOL) tablet 650 mg  650 mg Oral Q4H PRN Or  
 acetaminophen (TYLENOL) solution 650 mg  650 mg Per NG tube Q4H PRN Or  
 acetaminophen (TYLENOL) suppository 650 mg  650 mg Rectal Q4H PRN  
 0.9% sodium chloride infusion  75 mL/hr IntraVENous CONTINUOUS  
 levETIRAcetam (KEPPRA) 500 mg in 0.9% sodium chloride 100 mL IVPB  500 mg IntraVENous Q12H  
 famotidine (PF) (PEPCID) 20 mg in sodium chloride 0.9% 10 mL injection  20 mg IntraVENous Q12H  
 0.9% sodium chloride 4,881 mL with folic acid 1 mg, thiamine 100 mg, mvi, adult no. 4 with vit K 10 mL infusion   IntraVENous DAILY  0.9% sodium chloride infusion 250 mL  250 mL IntraVENous PRN  
 cefTRIAXone (ROCEPHIN) 1 g in 0.9% sodium chloride (MBP/ADV) 50 mL  1 g IntraVENous Q24H  
 LORazepam (ATIVAN) injection 2 mg  2 mg IntraVENous Q2H PRN  
 LORazepam (ATIVAN) injection 4 mg  4 mg IntraVENous Q1H PRN  
 LORazepam (ATIVAN) injection 2 mg  2 mg IntraVENous Q1H PRN  
 dexmedeTOMidine (PRECEDEX) 400 mcg in 0.9% sodium chloride 100 mL infusion  0.2-1.2 mcg/kg/hr IntraVENous TITRATE  SALINE PERIPHERAL FLUSH Q8H soln 5 mL  5 mL InterCATHeter Q8H  
 
______________________________________________________________________ EXPECTED LENGTH OF STAY: - - - 
ACTUAL LENGTH OF STAY:          2 Latha Betancur MD

## 2018-11-12 ENCOUNTER — APPOINTMENT (OUTPATIENT)
Dept: CT IMAGING | Age: 38
DRG: 064 | End: 2018-11-12
Attending: NURSE PRACTITIONER
Payer: SELF-PAY

## 2018-11-12 PROCEDURE — 94760 N-INVAS EAR/PLS OXIMETRY 1: CPT

## 2018-11-12 PROCEDURE — 74011000258 HC RX REV CODE- 258: Performed by: NEUROLOGICAL SURGERY

## 2018-11-12 PROCEDURE — 65610000006 HC RM INTENSIVE CARE

## 2018-11-12 PROCEDURE — 74011000258 HC RX REV CODE- 258: Performed by: FAMILY MEDICINE

## 2018-11-12 PROCEDURE — 70496 CT ANGIOGRAPHY HEAD: CPT

## 2018-11-12 PROCEDURE — 74011000250 HC RX REV CODE- 250: Performed by: FAMILY MEDICINE

## 2018-11-12 PROCEDURE — 74011250636 HC RX REV CODE- 250/636: Performed by: NEUROLOGICAL SURGERY

## 2018-11-12 PROCEDURE — 74011000258 HC RX REV CODE- 258: Performed by: RADIOLOGY

## 2018-11-12 PROCEDURE — 74011250636 HC RX REV CODE- 250/636: Performed by: FAMILY MEDICINE

## 2018-11-12 PROCEDURE — 74011250636 HC RX REV CODE- 250/636: Performed by: INTERNAL MEDICINE

## 2018-11-12 PROCEDURE — 74011000250 HC RX REV CODE- 250: Performed by: NEUROLOGICAL SURGERY

## 2018-11-12 PROCEDURE — 74011636320 HC RX REV CODE- 636/320: Performed by: RADIOLOGY

## 2018-11-12 RX ORDER — SODIUM CHLORIDE 0.9 % (FLUSH) 0.9 %
10 SYRINGE (ML) INJECTION
Status: COMPLETED | OUTPATIENT
Start: 2018-11-12 | End: 2018-11-12

## 2018-11-12 RX ADMIN — HYDRALAZINE HYDROCHLORIDE 20 MG: 20 INJECTION INTRAMUSCULAR; INTRAVENOUS at 08:05

## 2018-11-12 RX ADMIN — SODIUM CHLORIDE 0.6 MCG/KG/HR: 900 INJECTION, SOLUTION INTRAVENOUS at 04:41

## 2018-11-12 RX ADMIN — Medication 10 ML: at 12:16

## 2018-11-12 RX ADMIN — Medication 5 ML: at 22:49

## 2018-11-12 RX ADMIN — FAMOTIDINE 20 MG: 10 INJECTION, SOLUTION INTRAVENOUS at 07:57

## 2018-11-12 RX ADMIN — SODIUM CHLORIDE 75 ML/HR: 900 INJECTION, SOLUTION INTRAVENOUS at 22:41

## 2018-11-12 RX ADMIN — SODIUM CHLORIDE 500 MG: 900 INJECTION, SOLUTION INTRAVENOUS at 05:29

## 2018-11-12 RX ADMIN — SODIUM CHLORIDE 0.7 MCG/KG/HR: 900 INJECTION, SOLUTION INTRAVENOUS at 23:59

## 2018-11-12 RX ADMIN — SODIUM CHLORIDE 0.8 MCG/KG/HR: 900 INJECTION, SOLUTION INTRAVENOUS at 18:25

## 2018-11-12 RX ADMIN — Medication 20 ML: at 05:38

## 2018-11-12 RX ADMIN — FAMOTIDINE 20 MG: 10 INJECTION, SOLUTION INTRAVENOUS at 20:12

## 2018-11-12 RX ADMIN — FOLIC ACID: 5 INJECTION, SOLUTION INTRAMUSCULAR; INTRAVENOUS; SUBCUTANEOUS at 08:05

## 2018-11-12 RX ADMIN — CEFTRIAXONE 1 G: 1 INJECTION, POWDER, FOR SOLUTION INTRAMUSCULAR; INTRAVENOUS at 16:26

## 2018-11-12 RX ADMIN — SODIUM CHLORIDE 0.6 MCG/KG/HR: 900 INJECTION, SOLUTION INTRAVENOUS at 12:58

## 2018-11-12 RX ADMIN — LORAZEPAM 4 MG: 2 INJECTION INTRAMUSCULAR; INTRAVENOUS at 11:12

## 2018-11-12 RX ADMIN — SODIUM CHLORIDE 500 MG: 900 INJECTION, SOLUTION INTRAVENOUS at 18:28

## 2018-11-12 RX ADMIN — Medication 5 ML: at 16:11

## 2018-11-12 RX ADMIN — LORAZEPAM 4 MG: 2 INJECTION INTRAMUSCULAR; INTRAVENOUS at 13:03

## 2018-11-12 RX ADMIN — IOPAMIDOL 100 ML: 755 INJECTION, SOLUTION INTRAVENOUS at 12:16

## 2018-11-12 RX ADMIN — SODIUM CHLORIDE 100 ML: 900 INJECTION, SOLUTION INTRAVENOUS at 12:16

## 2018-11-12 NOTE — PROGRESS NOTES
Neurosurgery Progress Note Tressa Del Rosario 
[phone] Admit Date: 2018 LOS: 3 days Daily Progress Note: 2018 Subjective: The patient was brought to the ER by his sister who said he was not acting right. She states he drinks a lot of alcohol usually and was unable to walk and not answering questions appropriately. The patient fell in the shower the night prior to admission after he began acting funny. 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 His head CT in the ER revealed a left occipital ICH. [facility] to [facility]. He is currently on Precedex for sedation and receiving Ativan per CIWA scale. He has received a total of 4 mg Ativan in the past 24 hours and is currently on Precedex 0.5 mcg/kg/hr. He is running a mild fever and has been started on Rocephin. He also has thrombocytopenia likely due to his alcohol abuse. Unable to obtain ROS due to sedation for agitation Objective:  
 
Vital signs Temp (24hrs), Av.3 °F (37.4 °C), Min:99.2 °F (37.3 °C), Max:99.4 °F (37.4 °C) No intake/output data recorded. 11/10 1901 -  0700 In: 3564.3 [I.V.:3564.3] Out: 4175 [URLLT:6013] Visit Vitals /73 Pulse 63 Temp 99.4 °F (37.4 °C) Resp 18 Ht 5' 8\" (1.727 m) Wt 86.2 kg (190 lb 0.6 oz) SpO2 98% BMI 28.89 kg/m² O2 Device: Room air Pain control Pain Assessment Pain Scale 1: Adult Nonverbal Pain Scale Pain Intensity 1: 0 
 
PT/OT Gait Physical Exam: 
Gen:NAD. Neuro: Lethargic due to precedex. Intermittently follows commands. Affecr flat. Eduardo [place] PERRL. DIEHL spontaneously. Gait deferred. CT head without contrast on 18 shows acute left parietal intraparenchymal hemorrhage CT head without contrast on 11/10/18 shows unchanged left parieto-occipital intraparenchymal hemorrhage. 24 hour results: No results found for this or any previous visit (from the past 24 hour(s)). Assessment:  
 
Principal Problem: 
  ICH (intracerebral hemorrhage) ([facility] [address] (11/9/2018) Plan: 1. Left parieto-occipital hemorrhage, non-traumatic - CTA head to look for underlying mass or vascular abnormality as cause 
 - Neuro checks every 2 hours 
 - PT/OT as able - No surgical intervention as this time 2. Brain compression with cerebral edema 
 - due to #1 
 - steroids not indicated 3. Alcohol abuse - Goody bag 
 - [facility] protocol - Precedex PRN 4. Metabolic encephalopathy 
 - due to #1, 2, elevated ammonia levels 
 - supportive care 5. Thrombocytopenia 
 - Received 2 unit of platelets 47/07 
 - Received 1 unit of platelets 95/87  
 - Plt 64,000 
 - Recheck CBC in am 
 - Transfuse PRN in light of ICH 
 - Hospitalist following 6. Elevated LFTs 
 - due to #3 
 - CMP in am 
 - Hospitalist following Activity: up with assist 
DVT ppx: SCDs Dispo: tbd 
Plan d/w Dr. Jacki De La Cruz, sister at bedside.  
 
 
Martha Tom NP

## 2018-11-12 NOTE — PROGRESS NOTES
Hospitalist Progress Note Antionette Echevarria MD 
     
                             Answering service: [phone] OR [phone] from in house phone Date of Service:  2018 NAME:  Jason Giron 
:  1980 MRN:  [id number] Admission Summary:  
Patient is a 41year old  man who is heavy alcoholic brought to the ER for confusion. He had fallen the night prior. He was admitted to the ICU for ICH and alcohol withdrawal. 
 
Reason for follow up:  
Sedated. Family in the room. Assessment & Plan:  
Acute metabolic encephalopathy due to acute left parietal intraparenchymal hemorrhage,non traumatic as suspected by NS and also etoh withdrawal 
-remained chemically sedated for alcohol withdrawal,thus unable to perform mental status assessment. Possibly non traumatic ICH associated with brain compression and vasogenic edema  
-Neurosurgery following: no surgical intervention or steroids indicated 
-repeat CT stable 
-CTA head pending 
-Continue keppra, neuro check and icu monitoring. UTI,poa: continue ceftriaxone. No urine cx was sent. Will ask add on if original specimen available. Alcohol withdrawal 
-On CIWA protocol. Goodie bag. Soft wrist restraints. Thrombocytopenia 
-Due likely to ETOH. Monitor  
-S/p 3 units of platelets. Plt Hyponatremia due to alcohol: improved. Traumatic hematuria;clearing Diet:npo. If not alert enough for oral intake by tomorrow,will have to initiate NGT enteral feeding. Code status: full DVT prophylaxis: scd/ppi Care Plan discussed with:nurse,sister at bedside. Hospital Problems  Date Reviewed: 2018 Codes Class Noted POA * (Principal) ICH (intracerebral hemorrhage) ([facility] ICD-10-CM: I61.9 ICD-9-CM: 785  2018 Unknown Review of Systems: Review of systems not obtained due to patient factors. Physical Examination:  
 
 Last 24hrs VS reviewed since prior progress note. Most recent are: 
Visit Vitals /78 Pulse 61 Temp 98.8 °F (37.1 °C) Resp 16 Ht 5' 8\" (1.727 m) Wt 86.2 kg (190 lb 0.6 oz) SpO2 97% BMI 28.89 kg/m² Constitutional:  sedated HEENT: Head is a traumatic, Un icteric sclera. Pink conjunctiva,no erythema or discharge. Oral mucous moist, oropharynx benign. Neck supple, Resp:  CTA bilaterally. No wheezing/rhonchi/rales. No accessory muscle use CV:  Regular rhythm, normal rate, no murmurs, gallops, rubs GI:  Soft, non distended, non tender. normoactive bowel sounds, no hepatosplenomegaly :  No CVA or suprapubic tenderness Skin  :  No erythema,rash,bullae,dipigmentation Musculoskeletal:  bilateral soft restraints. Neurologic: Sedated. Intake/Output Summary (Last 24 hours) at 11/12/2018 1653 Last data filed at 11/12/2018 1100 Gross per 24 hour Intake 1814.06 ml Output 2500 ml Net -685.94 ml Data Review:  
 Review and/or order of clinical lab test 
Review and/or order of tests in the radiology section of CPT Review and/or order of tests in the medicine section of CPT Labs:  
 
Recent Labs 11/10/18 
0446 WBC 9.9 HGB 12.6 HCT 36.3*  
PLT 64* Recent Labs 11/10/18 
0446 * K 3.8  CO2 25 BUN 9  
CREA 0.56* * CA 7.9* No results for input(s): SGOT, GPT, ALT, AP, TBIL, TBILI, TP, ALB, GLOB, GGT, AML, LPSE in the last 72 hours. No lab exists for component: AMYP, HLPSE No results for input(s): INR, PTP, APTT in the last 72 hours. No lab exists for component: INREXT, INREXT No results for input(s): FE, TIBC, PSAT, FERR in the last 72 hours. No results found for: FOL, RBCF No results for input(s): PH, PCO2, PO2 in the last 72 hours. No results for input(s): CPK, CKNDX, TROIQ in the last 72 hours. No lab exists for component: CPKMB Lab Results Component Value Date/Time Cholesterol, total 82 11/09/2018 05:23 PM  
 HDL Cholesterol 31 11/09/2018 05:23 PM  
 LDL, calculated 39.6 11/09/2018 05:23 PM  
 Triglyceride 57 11/09/2018 05:23 PM  
 CHOL/HDL Ratio 2.6 11/09/2018 05:23 PM  
 
No results found for: Shaji Ser Lab Results Component Value Date/Time Color ORANGE 11/09/2018 01:07 PM  
 Appearance CLOUDY (A) 11/09/2018 01:07 PM  
 Specific gravity 1.025 11/09/2018 01:07 PM  
 pH (UA) 6.5 11/09/2018 01:07 PM  
 Protein 300 (A) 11/09/2018 01:07 PM  
 Glucose NEGATIVE  11/09/2018 01:07 PM  
 Ketone 15 (A) 11/09/2018 01:07 PM  
 Urobilinogen >8.0 (H) 11/09/2018 01:07 PM  
 Nitrites POSITIVE (A) 11/09/2018 01:07 PM  
 Leukocyte Esterase SMALL (A) 11/09/2018 01:07 PM  
 Epithelial cells FEW 11/09/2018 01:07 PM  
 Bacteria NEGATIVE  11/09/2018 01:07 PM  
 WBC 5-10 11/09/2018 01:07 PM  
 RBC 20-50 11/09/2018 01:07 PM  
 
 
 
Medications Reviewed:  
 
Current Facility-Administered Medications Medication Dose Route Frequency  labetalol (NORMODYNE;TRANDATE) injection 20 mg  20 mg IntraVENous Q6H PRN  
 hydrALAZINE (APRESOLINE) 20 mg/mL injection 20 mg  20 mg IntraVENous Q6H PRN  
 ondansetron (ZOFRAN) injection 4 mg  4 mg IntraVENous Q6H PRN  
 naloxone (NARCAN) injection 0.4 mg  0.4 mg IntraVENous PRN  
 acetaminophen (TYLENOL) tablet 650 mg  650 mg Oral Q4H PRN Or  
 acetaminophen (TYLENOL) solution 650 mg  650 mg Per NG tube Q4H PRN Or  
 acetaminophen (TYLENOL) suppository 650 mg  650 mg Rectal Q4H PRN  
 0.9% sodium chloride infusion  75 mL/hr IntraVENous CONTINUOUS  
 levETIRAcetam (KEPPRA) 500 mg in 0.9% sodium chloride 100 mL IVPB  500 mg IntraVENous Q12H  
 famotidine (PF) (PEPCID) 20 mg in sodium chloride 0.9% 10 mL injection  20 mg IntraVENous Q12H  
 0.9% sodium chloride 3,021 mL with folic acid 1 mg, thiamine 100 mg, mvi, adult no. 4 with vit K 10 mL infusion   IntraVENous DAILY  0.9% sodium chloride infusion 250 mL  250 mL IntraVENous PRN  
 cefTRIAXone (ROCEPHIN) 1 g in 0.9% sodium chloride (MBP/ADV) 50 mL  1 g IntraVENous Q24H  
 LORazepam (ATIVAN) injection 2 mg  2 mg IntraVENous Q2H PRN  
 LORazepam (ATIVAN) injection 4 mg  4 mg IntraVENous Q1H PRN  
 LORazepam (ATIVAN) injection 2 mg  2 mg IntraVENous Q1H PRN  
 dexmedeTOMidine (PRECEDEX) 400 mcg in 0.9% sodium chloride 100 mL infusion  0.2-1.2 mcg/kg/hr IntraVENous TITRATE  SALINE PERIPHERAL FLUSH Q8H soln 5 mL  5 mL InterCATHeter Q8H  
 
______________________________________________________________________ EXPECTED LENGTH OF STAY: 3d 4h 
ACTUAL LENGTH OF STAY:          3 Antionette Echevarria MD

## 2018-11-12 NOTE — CDMP QUERY
Rita Tabares, There is documentation in the medical record of \"My suspicion based on the imaging studies is that he had a hemorrhage first and then fell in the shower. \" by the Neurosurgery consultant. Please specify if you concur with the documentation noted; 
 
=> Nontraumatic intracerebral hemorrhage in the setting of alcohol intoxication requiring monitoring & neurosurgery consult 
=> Other explanation of clinical findings 
=> Clinically Undetermined (no explanation for clinical findings) The medical record reflects the following clinical findings, treatment, and risk factors. Risk Factors:  Alcohol intoxication Clinical Indicators:  Per head CT: Acute left parietal intraparenchymal hemorrhage Treatment: monitoring, Neurosurgery consult Please clarify and document your clinical opinion in the progress notes and discharge summary including the definitive and/or presumptive diagnosis, (suspected or probable), related to the above clinical findings. Please include clinical findings supporting your diagnosis. Thank you, Lulú Damon [facility] 
[phone]

## 2018-11-12 NOTE — CDMP QUERY
Rita Mota, There is documentation of encephalopathy noted in the medical record, could this be further specified as; 
 
=> Metabolic encephalopathy in the setting of ICH requiring IV Ativan, Head CT, soft wrist restraints 
=> Other explanation of clinical findings 
=> Clinically Undetermined (no explanation for clinical findings) The medical record reflects the following clinical findings, treatment, and risk factors. Risk Factors:  Alcohol intoxication Clinical Indicators:  confusion, UA: + nitrites, small leuk est, 1+ mucus Treatment: IV Ativan, Head CT, soft wrist restraints, IV Rocephin Please clarify and document your clinical opinion in the progress notes and discharge summary including the definitive and/or presumptive diagnosis, (suspected or probable), related to the above clinical findings. Please include clinical findings supporting your diagnosis. Thank you, Aly Garcia [facility] 
[phone]

## 2018-11-12 NOTE — PROGRESS NOTES
Zoe Minor / Pulmonary / Critical Care Assessment / Plan:   
 
Intracranial hemorrhage EtOH abuse Thrombocytopenia 2/2 to EtOH abuse Low grade fever - started on rocephin 
 
--monitor in ICU 
--BP control 
--Keppra 
--treatment for EtOH withdrawal (CIWA / precedex) 
--goodie bag 
--electrolytes as needed 
--on rocephin empirically --pepcid / SCD's 
--NS following History / Subjective: 
Hospital Day: 4 - Interval history and notes reviewed 40 yo EtOH abuse confused after fall. Found to have [address]. Admitted to ICU Remains on precedex with prn ativan (CIWA) For brain CTA today per NS Objective: 
Patient Vitals for the past 4 hrs: 
 BP Pulse Resp SpO2  
18 0900 114/69 64 18 98 % 18 0800 (!) 161/91 (!) 55 16 96 % 18 0700 (!) 155/107 65 12 95 % 18 0600 (!) 147/100 61 19 95 % Temp (24hrs), Av.3 °F (37.4 °C), Min:99.2 °F (37.3 °C), Max:99.4 °F (37.4 °C) Intake/Output Summary (Last 24 hours) at 2018 4384 Last data filed at 2018 0700 Gross per 24 hour Intake 2126.76 ml Output 2500 ml Net -373.24 ml No results found for: Mikayla [facility] Exam: 
Appears comfortable NAD Data:  
Interval lab and diagnostic data was reviewed. Interval radiology images were independently viewed and available reports were reviewed. CXR - no acute process Head CT - no change in 2.8 x 5.6 cm parenchymal hemorrhage in L parietal / occiptal lobs with vasogenic edema. Minimal 3 mm shift to right Lab: 
Recent Labs 11/10/18 
0446 18 
1239 WBC 9.9 10.5 HGB 12.6 13.9 PLT 64* 39* * 124* K 3.8 3.8  92* CO2 25 26 BUN 9 7  
CREA 0.56* 0.73 * 122* CA 7.9* 8.7 MG  --  2.0 INR  --  1.3* TBILI  --  4.3* SGOT  --  56* Ale Lugo MD

## 2018-11-12 NOTE — CDMP QUERY
Rita Parker, Please clarify if this patient is (was) being treated/managed for:  
 
=> Urinary tract infection (POA) in the setting of hematuria, abnormal UA requiring IV Rocephin 
=> Other explanation of clinical findings 
=> Clinically Undetermined (no explanation for clinical findings) The medical record reflects the following clinical findings, treatment, and risk factors. Risk Factors:  ICH, alcohol intoxication Clinical Indicators: UA: +nitrites, small leuk est, 1+ mucus Treatment: IV Rocephin Please clarify and document your clinical opinion in the progress notes and discharge summary including the definitive and/or presumptive diagnosis, (suspected or probable), related to the above clinical findings. Please include clinical findings supporting your diagnosis. Thank you, Devaughn Hodgkins [facility] 
[phone]

## 2018-11-12 NOTE — CDMP QUERY
Rita Davies, Please clarify if this patient is (was) being treated/managed for:  
 
=> Compression of brain in the setting of ICH requiring neuro checks Q 1 hour, Neurosurgery consult & monitoring 
=> Other explanation of clinical findings 
=> Clinically Undetermined (no explanation for clinical findings) The medical record reflects the following clinical findings, treatment, and risk factors. Risk Factors:  ICH, confusion Clinical Indicators:  Head CT 11/9: The posterior horn of the left lateral ventricle is effaced and inferiorly displaced and there is disproportionate enlargement of the temporal horn of the left lateral ventricle. There is approximately 3 mm of left-to-right subfalcine herniation. Treatment: Neuro checks Q 1hr, monitoring, Neurosurgery consult Please clarify and document your clinical opinion in the progress notes and discharge summary including the definitive and/or presumptive diagnosis, (suspected or probable), related to the above clinical findings. Please include clinical findings supporting your diagnosis. Thank you, Celia Ray [facility] 
[phone]

## 2018-11-12 NOTE — PROGRESS NOTES
1930-bedside shift report received from [address] [place] [address] using sbar format/alarms checked 0730-bedside shift report given to RN using sbar format

## 2018-11-12 NOTE — PROGRESS NOTES
Occupational Therapy 9796 -  
11.12.2018 Orders acknowledged and chart reviewed in prep for OT evaluation. RN reporting patient currently sedated 2* agitation and minimally following commands, not appropriate for skilled intervention at this time. Will continue to follow. Thank you. Maria Esther Garcia MS, OTR/L

## 2018-11-12 NOTE — PROGRESS NOTES
Physical Therapy 0463 -  
11.12.2018 
  
Orders acknowledged and chart reviewed in prep for PT evaluation. RN reporting patient currently sedated 2* agitation and minimally following commands, not appropriate for skilled intervention at this time. Will continue to follow. 
  
Umu Butcher, PT, DPT

## 2018-11-13 LAB
ALBUMIN SERPL-MCNC: 2.6 G/DL (ref 3.5–5)
ALBUMIN/GLOB SERPL: 0.5 {RATIO} (ref 1.1–2.2)
ALP SERPL-CCNC: 99 U/L (ref 45–117)
ALT SERPL-CCNC: 23 U/L (ref 12–78)
ANION GAP SERPL CALC-SCNC: 10 MMOL/L (ref 5–15)
AST SERPL-CCNC: 37 U/L (ref 15–37)
BASOPHILS # BLD: 0.1 K/UL (ref 0–0.1)
BASOPHILS NFR BLD: 1 % (ref 0–1)
BILIRUB SERPL-MCNC: 1.9 MG/DL (ref 0.2–1)
BUN SERPL-MCNC: 10 MG/DL (ref 6–20)
BUN/CREAT SERPL: 20 (ref 12–20)
CALCIUM SERPL-MCNC: 7.9 MG/DL (ref 8.5–10.1)
CHLORIDE SERPL-SCNC: 109 MMOL/L (ref 97–108)
CO2 SERPL-SCNC: 18 MMOL/L (ref 21–32)
CREAT SERPL-MCNC: 0.51 MG/DL (ref 0.7–1.3)
DIFFERENTIAL METHOD BLD: ABNORMAL
EOSINOPHIL # BLD: 0.2 K/UL (ref 0–0.4)
EOSINOPHIL NFR BLD: 3 % (ref 0–7)
ERYTHROCYTE [DISTWIDTH] IN BLOOD BY AUTOMATED COUNT: 12.3 % (ref 11.5–14.5)
GLOBULIN SER CALC-MCNC: 5.5 G/DL (ref 2–4)
GLUCOSE SERPL-MCNC: 90 MG/DL (ref 65–100)
HCT VFR BLD AUTO: 39.6 % (ref 36.6–50.3)
HGB BLD-MCNC: 12.9 G/DL (ref 12.1–17)
IMM GRANULOCYTES # BLD: 0.1 K/UL (ref 0–0.04)
IMM GRANULOCYTES NFR BLD AUTO: 1 % (ref 0–0.5)
LYMPHOCYTES # BLD: 2 K/UL (ref 0.8–3.5)
LYMPHOCYTES NFR BLD: 24 % (ref 12–49)
MAGNESIUM SERPL-MCNC: 1.8 MG/DL (ref 1.6–2.4)
MCH RBC QN AUTO: 35.7 PG (ref 26–34)
MCHC RBC AUTO-ENTMCNC: 32.6 G/DL (ref 30–36.5)
MCV RBC AUTO: 109.7 FL (ref 80–99)
MONOCYTES # BLD: 1.6 K/UL (ref 0–1)
MONOCYTES NFR BLD: 20 % (ref 5–13)
NEUTS SEG # BLD: 4.2 K/UL (ref 1.8–8)
NEUTS SEG NFR BLD: 51 % (ref 32–75)
NRBC # BLD: 0 K/UL (ref 0–0.01)
NRBC BLD-RTO: 0 PER 100 WBC
PHOSPHATE SERPL-MCNC: 3.4 MG/DL (ref 2.6–4.7)
PLATELET # BLD AUTO: 61 K/UL (ref 150–400)
PLATELET COMMENTS,PCOM: ABNORMAL
PMV BLD AUTO: 12.4 FL (ref 8.9–12.9)
POTASSIUM SERPL-SCNC: 3.8 MMOL/L (ref 3.5–5.1)
PROT SERPL-MCNC: 8.1 G/DL (ref 6.4–8.2)
RBC # BLD AUTO: 3.61 M/UL (ref 4.1–5.7)
RBC MORPH BLD: ABNORMAL
SODIUM SERPL-SCNC: 137 MMOL/L (ref 136–145)
WBC # BLD AUTO: 8.2 K/UL (ref 4.1–11.1)

## 2018-11-13 PROCEDURE — 74011250636 HC RX REV CODE- 250/636: Performed by: NEUROLOGICAL SURGERY

## 2018-11-13 PROCEDURE — 77030010547 HC BG URIN W/UMETER -A

## 2018-11-13 PROCEDURE — 74011250636 HC RX REV CODE- 250/636: Performed by: FAMILY MEDICINE

## 2018-11-13 PROCEDURE — 97165 OT EVAL LOW COMPLEX 30 MIN: CPT

## 2018-11-13 PROCEDURE — 74011000258 HC RX REV CODE- 258: Performed by: FAMILY MEDICINE

## 2018-11-13 PROCEDURE — 36415 COLL VENOUS BLD VENIPUNCTURE: CPT

## 2018-11-13 PROCEDURE — 84100 ASSAY OF PHOSPHORUS: CPT

## 2018-11-13 PROCEDURE — 83735 ASSAY OF MAGNESIUM: CPT

## 2018-11-13 PROCEDURE — 85025 COMPLETE CBC W/AUTO DIFF WBC: CPT

## 2018-11-13 PROCEDURE — 74011250636 HC RX REV CODE- 250/636: Performed by: INTERNAL MEDICINE

## 2018-11-13 PROCEDURE — 97162 PT EVAL MOD COMPLEX 30 MIN: CPT

## 2018-11-13 PROCEDURE — 74011000250 HC RX REV CODE- 250: Performed by: NEUROLOGICAL SURGERY

## 2018-11-13 PROCEDURE — 74011000258 HC RX REV CODE- 258: Performed by: NEUROLOGICAL SURGERY

## 2018-11-13 PROCEDURE — 74011000250 HC RX REV CODE- 250: Performed by: FAMILY MEDICINE

## 2018-11-13 PROCEDURE — 92610 EVALUATE SWALLOWING FUNCTION: CPT

## 2018-11-13 PROCEDURE — 97530 THERAPEUTIC ACTIVITIES: CPT

## 2018-11-13 PROCEDURE — 65610000006 HC RM INTENSIVE CARE

## 2018-11-13 PROCEDURE — 97535 SELF CARE MNGMENT TRAINING: CPT

## 2018-11-13 PROCEDURE — 80053 COMPREHEN METABOLIC PANEL: CPT

## 2018-11-13 PROCEDURE — C1758 CATHETER, URETERAL: HCPCS

## 2018-11-13 RX ORDER — LORAZEPAM 2 MG/ML
1 INJECTION INTRAMUSCULAR
Status: DISCONTINUED | OUTPATIENT
Start: 2018-11-13 | End: 2018-11-16

## 2018-11-13 RX ORDER — LORAZEPAM 2 MG/ML
2 INJECTION INTRAMUSCULAR
Status: DISCONTINUED | OUTPATIENT
Start: 2018-11-13 | End: 2018-11-13

## 2018-11-13 RX ORDER — LORAZEPAM 2 MG/ML
2 INJECTION INTRAMUSCULAR
Status: DISCONTINUED | OUTPATIENT
Start: 2018-11-13 | End: 2018-11-16

## 2018-11-13 RX ADMIN — Medication 5 ML: at 06:07

## 2018-11-13 RX ADMIN — LORAZEPAM 1 MG: 2 INJECTION INTRAMUSCULAR; INTRAVENOUS at 20:14

## 2018-11-13 RX ADMIN — CEFTRIAXONE 1 G: 1 INJECTION, POWDER, FOR SOLUTION INTRAMUSCULAR; INTRAVENOUS at 17:26

## 2018-11-13 RX ADMIN — Medication 5 ML: at 08:33

## 2018-11-13 RX ADMIN — SODIUM CHLORIDE 500 MG: 900 INJECTION, SOLUTION INTRAVENOUS at 06:05

## 2018-11-13 RX ADMIN — FOLIC ACID: 5 INJECTION, SOLUTION INTRAMUSCULAR; INTRAVENOUS; SUBCUTANEOUS at 08:32

## 2018-11-13 RX ADMIN — FAMOTIDINE 20 MG: 10 INJECTION, SOLUTION INTRAVENOUS at 08:32

## 2018-11-13 RX ADMIN — FAMOTIDINE 20 MG: 10 INJECTION, SOLUTION INTRAVENOUS at 20:17

## 2018-11-13 RX ADMIN — HYDRALAZINE HYDROCHLORIDE 20 MG: 20 INJECTION INTRAMUSCULAR; INTRAVENOUS at 18:06

## 2018-11-13 RX ADMIN — SODIUM CHLORIDE 0.6 MCG/KG/HR: 900 INJECTION, SOLUTION INTRAVENOUS at 07:08

## 2018-11-13 RX ADMIN — SODIUM CHLORIDE 500 MG: 900 INJECTION, SOLUTION INTRAVENOUS at 18:03

## 2018-11-13 RX ADMIN — LORAZEPAM 2 MG: 2 INJECTION INTRAMUSCULAR; INTRAVENOUS at 01:08

## 2018-11-13 RX ADMIN — HYDRALAZINE HYDROCHLORIDE 20 MG: 20 INJECTION INTRAMUSCULAR; INTRAVENOUS at 07:18

## 2018-11-13 RX ADMIN — Medication 5 ML: at 21:25

## 2018-11-13 NOTE — PROGRESS NOTES
0730 - Bedside and Verbal shift change report given to anna wei (oncoming nurse) by Willie haney (offgoing nurse). Report included the following information SBAR, Kardex, Intake/Output, MAR, Recent Results and Cardiac Rhythm NSR.  
1515 - Bedside and Verbal shift change report given to krzysztof leonard (oncoming nurse) by Lilly Nolasco (offgoing nurse). Report included the following information SBAR, Kardex, Intake/Output, MAR, Recent Results and Cardiac Rhythm ST.

## 2018-11-13 NOTE — CONSULTS
Neurointerventional Surgery Consult    Patient: Dede Joseph MRN: [id number]  SSN: [id number]    YOB: 1980  Age: 41 y.o. Sex: male      Subjective:      Dede Joseph is a 41 y.o. male who is being seen for left parieto-occipital intraparenchymal hemorrhage, non traumatic. Per CTA head there is a 1 cm tuft along the posterior superior margin of the hematoma for which we are consulted. The patient has a PMH alcohol abuse over the last 12 years. He also presents with thrombocytopenia likely d/t his alcohol abuse. We examined the patient with his two Uncle's at bedside. He has a 11year old daughter and two sister's who also live in the area. Medical power of  would fall to his two sisters as he is not . No past medical history on file. No past surgical history on file. No family history on file. Social History     Tobacco Use    Smoking status: Current Some Day Smoker    Smokeless tobacco: Never Used   Substance Use Topics    Alcohol use:  Yes     Alcohol/week: 2.4 oz     Types: 4 Cans of beer per week     Comment: Every day, amount doubles on weekend      Current Facility-Administered Medications   Medication Dose Route Frequency Provider Last Rate Last Dose    LORazepam (ATIVAN) injection 1 mg  1 mg IntraVENous Q1H PRN Oscar Blunt MD        LORazepam (ATIVAN) injection 2 mg  2 mg IntraVENous Q1H PRN Oscar Blunt MD        labetalol (NORMODYNE;TRANDATE) injection 20 mg  20 mg IntraVENous Q6H PRN Oscar Blunt MD        hydrALAZINE (APRESOLINE) 20 mg/mL injection 20 mg  20 mg IntraVENous Q6H PRN Oscar Blunt MD   20 mg at 11/13/18 0718    ondansetron (ZOFRAN) injection 4 mg  4 mg IntraVENous Q6H PRN Jacqueline Cuevas MD        naloxone [facility]) injection 0.4 mg  0.4 mg IntraVENous PRN Jacqueline Cuevas MD        acetaminophen (TYLENOL) tablet 650 mg  650 mg Oral Q4H PRN Jacqueline Cuevas MD        Or    acetaminophen (TYLENOL) solution 650 mg  650 mg Per NG tube Q4H PRN Sudarshan Pritchett MD        Or   [place] acetaminophen (TYLENOL) suppository 650 mg  650 mg Rectal Q4H PRN Sudarshan Pritchett MD        0.9% sodium chloride infusion  75 mL/hr IntraVENous CONTINUOUS Sudarshan Pritchett MD 75 mL/hr at 11/12/18 2241 75 mL/hr at 11/12/18 2241    levETIRAcetam (KEPPRA) 500 mg in 0.9% sodium chloride 100 mL IVPB  500 mg IntraVENous Q12H Sudarshan Pritchett  mL/hr at 11/13/18 0605 500 mg at 11/13/18 0605    famotidine (PF) (PEPCID) 20 mg in sodium chloride 0.9% 10 mL injection  20 mg IntraVENous Q12H Sudarshan Pritchett MD   20 mg at 11/13/18 0832    0.9% sodium chloride 4,621 mL with folic acid 1 mg, thiamine 100 mg, mvi, adult no. 4 with vit K 10 mL infusion   IntraVENous DAILY Sudarshan Pritchett MD        0.9% sodium chloride infusion 250 mL  250 mL IntraVENous PRN Sudarshan Pritchett MD        cefTRIAXone (ROCEPHIN) 1 g in 0.9% sodium chloride (MBP/ADV) 50 mL  1 g IntraVENous Q24H Sudarshan Pritchett  mL/hr at 11/12/18 1626 1 g at 11/12/18 1626    dexmedeTOMidine (PRECEDEX) 400 mcg in 0.9% sodium chloride 100 mL infusion  0.2-1.2 mcg/kg/hr IntraVENous TITRATE Chano Berry MD   Stopped at 11/13/18 1123    SALINE PERIPHERAL FLUSH Q8H soln 5 mL  5 mL InterCATHeter Q8H Sudarshan Pritchett MD   5 mL at 11/13/18 0833        No Known Allergies    Review of Systems:     As above     Objective:     Vitals:    11/13/18 1012 11/13/18 1100 11/13/18 1200 11/13/18 1300   BP:  118/78 115/65 124/79   Pulse:  60 62 64   Resp:  14 12 16   Temp:   98.5 °F (36.9 °C)    SpO2:  98% 96% 96%   Weight: 186 lb 1.1 oz (84.4 kg)      Height:            Physical Exam:  GENERAL: alert, no distress, appears stated age, pale, confused    Neurologic Exam:  Mental Status:  Alert and oriented x 2. Patient is minimally sedated on Precedex. Language:    Malay Speaking. Cognitively impaired. Language Processing is slowed.     Cranial Nerves:   Pupils equal, round and reactive to light.     Unable to assess all visual fields due to poor command following. Seems to have a double vision. Extraocular movements intact. Full facial strength, no asymmetry. Hearing intact bilaterally. No dysarthria. Tongue protrudes to midline, palate elevates symmetrically. Shoulder shrug 5/5 on left side, unable to shrug shoulder on right. Motor:    No pronator drift on left. Right sided neglect. Bulk and tone normal on left      5/5 power in extremities proximally and distally on left. 3/5 right upper and lower extremity. No involuntary movements. Sensation:    Sensation intact throughout to light touch on left. Decreased sensation to right upper and lower extremity. Coordination & Gait: Gait deferred. Imaging:    Images were reviewed by myself and Dr. Dorina Aase. 11/10/2018:     EXAM:  CT HEAD WO CONT    IMPRESSION:   Unchanged left parieto-occipital intraparenchymal hemorrhage. 2.8 x 5.6 cm parenchymal hemorrhage in the left Parietal-occipital lobes       11/9/2018: IMPRESSION:      Acute left parietal intraparenchymal hemorrhage, as described above. 2.8 x 5.6 cm parenchymal hemorrhage    11/12/2018:    EXAM:  CTA HEAD  IMPRESSION:  1. No interval change in size and appearance of large left posterior parietal  parenchymal hematoma. 2. Small approximate 1 cm tuft of vessels along the posterior superior margin of  the hematoma. Possible vascular malformation  3. No other vascular abnormalities demonstrated. 4. No evidence of spots sign to suggest ongoing or progressive hemorrhage.     Assessment:     Hospital Problems  Date Reviewed: 11/9/2018          Codes Class Noted POA    * (Principal) [facility] ICD-10-CM: I61.9  ICD-9-CM: 035  11/9/2018 Unknown              Plan:     Left Parieto Occipital Intraparenchymal Hemorrhage ( ICH) , non traumatic (POA):   - neurosurgery and hospitalist medicine following   - 2.8 x 5.6 cm parenchymal hemorrhage  - CTA shows 1cm tuft of vessels along the posterior superior margin of the hematoma   - Will need angiogram prior to discharge from the hospital to further evaluate cerebral vessels and determine if intervention needed   - per neurosurgery , not a good candidate for crani to evacuate hemorrhage at this time   - Keppra  - we would like to see increase in platelet count prior to angiogram as well     Brain Compression with Cerebral Edema:   - steroids are not indicated at this time    Hx of Alcohol Abuse:   - currently on [facility] protocol ,   - precedex ordered   - primary team following     Thrombocytopenia:  - received a total of 3 units of platelets this admit   - counts are 61 today   - continue to monitor     Elevated LFTs:  - likely due to alcoholism   - primary team following           Thank you for this consult and participating in the care of this patient. I have discussed the diagnosis with the patient and the intended plan as seen in the above orders. Patient is in agreement.       Signed By: Naman Balderas NP     November 13, 2018

## 2018-11-13 NOTE — PROGRESS NOTES
Hospitalist Progress Note Betty Garcia MD 
Answering service: [phone] OR [phone] from in house phone Date of Service:  2018 NAME:  Krystina Giron 
:  1980 MRN:  [id number] Admission Summary:  
 
Patient is a 41year old  man who is heavy alcoholic brought to the ER for confusion. He had fallen the night prior. He was admitted to the ICU for ICH and alcohol withdrawal.  
 
 
Interval history / Subjective: More awake, cutting off precedex Assessment & Plan:  
 
Acute metabolic encephalopathy due to acute left parietal intraparenchymal hemorrhage,non traumatic as suspected by NS and also etoh withdrawal 
-remained sedated for alcohol withdrawal. 
- Seen by NSGY and requested eval from [facility] 
  
Possibly non traumatic ICH associated with brain compression and vasogenic edema  
-Neurosurgery following: no surgical intervention or steroids indicated 
-repeat CT stable 
-CTA head - 1. No interval change in size and appearance of large left posterior parietal 
parenchymal hematoma. Small approximate 1 cm tuft of vessels along the posterior superior margin of the hematoma. Possible vascular malformation No other vascular abnormalities demonstrated. 4. No evidence of spots sign to suggest ongoing or progressive hemorrhage. 
-Continue keppra, neuro check and ICU monitoring. 
  
UTI,poa: continue ceftriaxone. No urine cx was sent. Will ask add on if original specimen available. 
  
Alcohol withdrawal 
-On CIWA protocol. Goodie bag. Soft wrist restraints. 
  
Thrombocytopenia 
-Due likely to ETOH. Monitor  
-S/p 3 units of platelets. Plt  
  
Hyponatremia due to alcohol: improved. 
  
Code status: Full DVT prophylaxis: SCD Care Plan discussed with: Patient/Family and Nurse Disposition: TBD Hospital Problems  Date Reviewed: 2018 Codes Class Noted POA * (Principal) ICH (intracerebral hemorrhage) ([facility] [address] ICD-10-CM: I61.9 ICD-9-CM: 931  11/9/2018 Unknown Review of Systems:  
Review of systems not obtained due to patient factors. Vital Signs:  
 Last 24hrs VS reviewed since prior progress note. Most recent are: 
Visit Vitals /65 (BP 1 Location: Right arm, BP Patient Position: At rest) Pulse 62 Temp 98.5 °F (36.9 °C) Resp 12 Ht 5' 8\" (1.727 m) Wt 84.4 kg (186 lb 1.1 oz) SpO2 96% BMI 28.29 kg/m² Intake/Output Summary (Last 24 hours) at 11/13/2018 1252 Last data filed at 11/13/2018 1200 Gross per 24 hour Intake 2230.47 ml Output 2850 ml Net -619.53 ml Physical Examination:  
 
 
     
Constitutional:  No acute distress, sedated ENT:  Oral mucous moist  
Resp:  CTA bilaterally. CV:  Regular rhythm, normal rate, GI:  Soft, non distended, non tender. bs= Musculoskeletal:  No edema, warm, 2+ pulses throughout Neurologic:  Moves all extremities. AAOx0 Data Review:  
 I personally reviewed  Image and labs Labs:  
 
Recent Labs 11/13/18 
0326 WBC 8.2 HGB 12.9 HCT 39.6 PLT 61* Recent Labs 11/13/18 
0326   
K 3.8 * CO2 18* BUN 10  
CREA 0.51* GLU 90  
CA 7.9*  
MG 1.8 PHOS 3.4 Recent Labs 11/13/18 
0326 SGOT 37 ALT 23 AP 99 TBILI 1.9* TP 8.1 ALB 2.6*  
GLOB 5.5* No results for input(s): INR, PTP, APTT in the last 72 hours. No lab exists for component: INREXT No results for input(s): FE, TIBC, PSAT, FERR in the last 72 hours. No results found for: FOL, RBCF No results for input(s): PH, PCO2, PO2 in the last 72 hours. No results for input(s): CPK, CKNDX, TROIQ in the last 72 hours. No lab exists for component: CPKMB Lab Results Component Value Date/Time  Cholesterol, total 82 11/09/2018 05:23 PM  
 HDL Cholesterol 31 11/09/2018 05:23 PM  
 LDL, calculated 39.6 11/09/2018 05:23 PM  
 Triglyceride 57 11/09/2018 05:23 PM  
 CHOL/HDL Ratio 2.6 11/09/2018 05:23 PM  
 
No results found for: Mikayla Heathurray Lab Results Component Value Date/Time Color ORANGE 11/09/2018 01:07 PM  
 Appearance CLOUDY (A) 11/09/2018 01:07 PM  
 Specific gravity 1.025 11/09/2018 01:07 PM  
 pH (UA) 6.5 11/09/2018 01:07 PM  
 Protein 300 (A) 11/09/2018 01:07 PM  
 Glucose NEGATIVE  11/09/2018 01:07 PM  
 Ketone 15 (A) 11/09/2018 01:07 PM  
 Urobilinogen >8.0 (H) 11/09/2018 01:07 PM  
 Nitrites POSITIVE (A) 11/09/2018 01:07 PM  
 Leukocyte Esterase SMALL (A) 11/09/2018 01:07 PM  
 Epithelial cells FEW 11/09/2018 01:07 PM  
 Bacteria NEGATIVE  11/09/2018 01:07 PM  
 WBC 5-10 11/09/2018 01:07 PM  
 RBC 20-50 11/09/2018 01:07 PM  
 
 
 
Medications Reviewed:  
 
Current Facility-Administered Medications Medication Dose Route Frequency  LORazepam (ATIVAN) injection 1 mg  1 mg IntraVENous Q1H PRN  
 LORazepam (ATIVAN) injection 2 mg  2 mg IntraVENous Q1H PRN  
 labetalol (NORMODYNE;TRANDATE) injection 20 mg  20 mg IntraVENous Q6H PRN  
 hydrALAZINE (APRESOLINE) 20 mg/mL injection 20 mg  20 mg IntraVENous Q6H PRN  
 ondansetron (ZOFRAN) injection 4 mg  4 mg IntraVENous Q6H PRN  
 naloxone (NARCAN) injection 0.4 mg  0.4 mg IntraVENous PRN  
 acetaminophen (TYLENOL) tablet 650 mg  650 mg Oral Q4H PRN Or  
 acetaminophen (TYLENOL) solution 650 mg  650 mg Per NG tube Q4H PRN Or  
 acetaminophen (TYLENOL) suppository 650 mg  650 mg Rectal Q4H PRN  
 0.9% sodium chloride infusion  75 mL/hr IntraVENous CONTINUOUS  
 levETIRAcetam (KEPPRA) 500 mg in 0.9% sodium chloride 100 mL IVPB  500 mg IntraVENous Q12H  
 famotidine (PF) (PEPCID) 20 mg in sodium chloride 0.9% 10 mL injection  20 mg IntraVENous Q12H  
 0.9% sodium chloride 5,635 mL with folic acid 1 mg, thiamine 100 mg, mvi, adult no. 4 with vit K 10 mL infusion   IntraVENous DAILY  0.9% sodium chloride infusion 250 mL  250 mL IntraVENous PRN  
  cefTRIAXone (ROCEPHIN) 1 g in 0.9% sodium chloride (MBP/ADV) 50 mL  1 g IntraVENous Q24H  
 dexmedeTOMidine (PRECEDEX) 400 mcg in 0.9% sodium chloride 100 mL infusion  0.2-1.2 mcg/kg/hr IntraVENous TITRATE  SALINE PERIPHERAL FLUSH Q8H soln 5 mL  5 mL InterCATHeter Q8H  
 
______________________________________________________________________ EXPECTED LENGTH OF STAY: 3d 4h 
ACTUAL LENGTH OF STAY:          4 Aleja Morris MD

## 2018-11-13 NOTE — PROGRESS NOTES
Neurosurgery Progress Note Akosua Gallo [place] 
[phone] Admit Date: 2018 LOS: 4 days Daily Progress Note: 2018 HPI: The patient was brought to the ER by his sister who said he was not acting right. She states he drinks a lot of alcohol usually and was unable to walk and not answering questions appropriately. The patient fell in the shower the night prior to admission after he began acting funny. 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 His head CT in the ER revealed a left occipital ICH. He was transferred from [facility] to [facility]. He is currently on Precedex for sedation and receiving Ativan per CIWA scale. He has received a total of 4 mg Ativan in the past 24 hours and is currently on Precedex 0.5 mcg/kg/hr. He is running a mild fever and has been started on Rocephin. He also has thrombocytopenia likely due to his alcohol abuse. Subjective: No acute events overnight. The patient's CTA reveals a possible tiny vascular malformation. There is no spot sign in the hemorrhage to suggest it is still bleeding. Precedex in process of being weaned. Unable to obtain ROS due to sedation for agitation Objective:  
 
Vital signs Temp (24hrs), Av.5 °F (36.9 °C), Min:97.9 °F (36.6 °C), Max:99.2 °F (37.3 °C) 
 701 - 1900 In: -  
Out: 270 [Urine:270]  1901 -  07 In: 3592.3 [I.V.:3592.3] Out: 6039 [QHO:5865] Visit Vitals /64 (BP 1 Location: Right arm, BP Patient Position: At rest) Pulse 62 Temp 97.9 °F (36.6 °C) Resp 14 Ht 5' 8\" (1.727 m) Wt 86.2 kg (190 lb 0.6 oz) SpO2 96% BMI 28.89 kg/m² O2 Device: Room air Pain control Pain Assessment Pain Scale 1: Adult Nonverbal Pain Scale Pain Intensity 1: 0 
 
PT/OT Gait Physical Exam: 
Gen:NAD. Neuro: Lethargic due to precedex. Intermittently follows commands. Affect flat. Sotero Dangelo PERRL. DIEHL spontaneously. Oriented to person. Not oriented to place or year. When I ask him where he is, he replies \"[place]\" Gait deferred. CT head without contrast on 11/09/18 shows acute left parietal intraparenchymal hemorrhage CTA head with contrast on 11/12/18 shows no interval change in size and appearance of large left posterior parietal parenchymal hematoma. Small approximate 1 cm tuft of vessels along the posterior superior margin of the hematoma. Possible vascular malformation. No other vascular abnormalities demonstrated. No evidence of spots sign to suggest ongoing or progressive hemorrhage. 24 hour results: 
 
Recent Results (from the past 24 hour(s)) CBC WITH AUTOMATED DIFF Collection Time: 11/13/18  3:26 AM  
Result Value Ref Range WBC 8.2 4.1 - 11.1 K/uL  
 RBC 3.61 (L) 4.10 - 5.70 M/uL  
 HGB 12.9 12.1 - 17.0 g/dL HCT 39.6 36.6 - 50.3 % .7 (H) 80.0 - 99.0 FL  
 MCH 35.7 (H) 26.0 - 34.0 PG  
 MCHC 32.6 30.0 - 36.5 g/dL  
 RDW 12.3 11.5 - 14.5 % PLATELET 61 (L) 840 - 400 K/uL MPV 12.4 8.9 - 12.9 FL  
 NRBC 0.0 0  WBC ABSOLUTE NRBC 0.00 0.00 - 0.01 K/uL NEUTROPHILS 51 32 - 75 % LYMPHOCYTES 24 12 - 49 % MONOCYTES 20 (H) 5 - 13 % EOSINOPHILS 3 0 - 7 % BASOPHILS 1 0 - 1 % IMMATURE GRANULOCYTES 1 (H) 0.0 - 0.5 % ABS. NEUTROPHILS 4.2 1.8 - 8.0 K/UL  
 ABS. LYMPHOCYTES 2.0 0.8 - 3.5 K/UL  
 ABS. MONOCYTES 1.6 (H) 0.0 - 1.0 K/UL  
 ABS. EOSINOPHILS 0.2 0.0 - 0.4 K/UL  
 ABS. BASOPHILS 0.1 0.0 - 0.1 K/UL  
 ABS. IMM. GRANS. 0.1 (H) 0.00 - 0.04 K/UL  
 DF SMEAR SCANNED    
 PLATELET COMMENTS Large Platelets RBC COMMENTS MACROCYTOSIS 
2+ MAGNESIUM Collection Time: 11/13/18  3:26 AM  
Result Value Ref Range Magnesium 1.8 1.6 - 2.4 mg/dL METABOLIC PANEL, COMPREHENSIVE Collection Time: 11/13/18  3:26 AM  
Result Value Ref Range Sodium 137 136 - 145 mmol/L Potassium 3.8 3.5 - 5.1 mmol/L  Chloride 109 (H) 97 - 108 mmol/L  
 CO2 18 (L) 21 - 32 mmol/L Anion gap 10 5 - 15 mmol/L Glucose 90 65 - 100 mg/dL BUN 10 6 - 20 MG/DL Creatinine 0.51 (L) 0.70 - 1.30 MG/DL  
 BUN/Creatinine ratio 20 12 - 20 GFR est AA >60 >60 ml/min/1.73m2 GFR est non-AA >60 >60 ml/min/1.73m2 Calcium 7.9 (L) 8.5 - 10.1 MG/DL Bilirubin, total 1.9 (H) 0.2 - 1.0 MG/DL  
 ALT (SGPT) 23 12 - 78 U/L  
 AST (SGOT) 37 15 - 37 U/L Alk. phosphatase 99 45 - 117 U/L Protein, total 8.1 6.4 - 8.2 g/dL Albumin 2.6 (L) 3.5 - 5.0 g/dL Globulin 5.5 (H) 2.0 - 4.0 g/dL A-G Ratio 0.5 (L) 1.1 - 2.2 PHOSPHORUS Collection Time: 11/13/18  3:26 AM  
Result Value Ref Range Phosphorus 3.4 2.6 - 4.7 MG/DL Assessment:  
 
Principal Problem: 
  ICH (intracerebral hemorrhage) ([facility] [address] (11/9/2018) Plan: 1. Left parieto-occipital hemorrhage, non-traumatic - CTA head shows possible vascular malformation. Will consult [facility] to see if they could do anything to embolize it 
 - Neuro checks every 2 hours 
 - PT/OT as able - Not a good surgical candidate for crani to evacuate the hemorrhage and vascular malformation due to thrombocytopenia and underlying liver disease. 2. Brain compression with cerebral edema 
 - due to #1 
 - steroids not indicated 3. Alcohol abuse - Goody bag 
 - [facility] protocol - Precedex PRN 4. Metabolic encephalopathy 
 - due to #1, 2, elevated ammonia levels 
 - supportive care 5. Thrombocytopenia 
 - Received 2 unit of platelets 50/67 
 - Received 1 unit of platelets 39/03  
 - Plt 64,000 
 - Recheck CBC in am 
 - Transfuse PRN in light of ICH 
 - Hospitalist following 6. Elevated LFTs 
 - due to #3 
 - CMP in am 
 - Hospitalist following Activity: up with assist 
DVT ppx: SCDs Dispo: tbd 
Plan d/w Dr. Alejandra Quintanilla, ICU nurse.  
 
 
Esteban Knox NP

## 2018-11-13 NOTE — PROGRESS NOTES
Awake alert  CTA results noted  IR to do angio later this week  Thrombocytopenia which is chronic make him poor candidate for any surgery but agree safer to treat possible AVM via endovascular approach  Will s/o case since he is awake and seems stable

## 2018-11-13 NOTE — PROGRESS NOTES
Problem: Mobility Impaired (Adult and Pediatric) Goal: *Acute Goals and Plan of Care (Insert Text) Physical Therapy Goals Initiated 11/13/2018 1. Patient will move from supine to sit and sit to supine , scoot up and down and roll side to side in bed with supervision/set-up within 7 day(s). 2.  Patient will transfer from bed to chair and chair to bed with minimal assistance/contact guard assist using the least restrictive device within 7 day(s). 3.  Patient will perform sit to stand with minimal assistance/contact guard assist within 7 day(s). 4.  Patient will ambulate with minimal assistance/contact guard assist for 50 feet with the least restrictive device within 7 day(s). 6.  Patient will complete Alcazar Balance Test within 7 days. physical Therapy EVALUATION- neuro population Patient: Hedy Bowers (73 y.o. male) Date: 11/13/2018 Primary Diagnosis: ICH (intracerebral hemorrhage) ([facility] [address] Precautions:   Fall, Bed Alarm ASSESSMENT : 
 Based on the objective data described below, the patient presents with decreased independence with mobility compared to baseline level prior to admission due to R sided weakness, R inattention, decreased AROM, lethargy/drowsiness, impaired balance, intermittent confusion, impulsiveness, impaired cognition, and decreased safety awareness. Patient cleared by nursing for mobility and agreeable to participate in mobility assessment. Weaning sedative. Patient vital signs within normal limits. Patient able to perform bed mobility and achieve EOB sitting with supervision/min A and additional time. Patient performed sit<>stand tx with min A x 2, additional time, and demonstrates even WB bilaterally and good immediate standing balance with UE support of B HHA. He is a high fall risk at this time. Returned to supine in restraints as patient drowsy and confused.   Will continue to follow to progress towards acute care goals. Recommend inpatient rehab at d/c to continue to optimize independence and safety with mobility. Patient will benefit from skilled intervention to address the above impairments. Patients rehabilitation potential is considered to be Fair Factors which may influence rehabilitation potential include:  
[]           None noted 
[x]           Mental ability/status [x]           Medical condition 
[x]           Home/family situation and support systems 
[x]           Safety awareness 
[]           Pain tolerance/management 
[]           Other: PLAN : 
Recommendations and Planned Interventions: 
[x]             Bed Mobility Training             [x]      Neuromuscular Re-Education [x]             Transfer Training                   []      Orthotic/Prosthetic Training 
[x]             Gait Training                         []      Modalities [x]             Therapeutic Exercises           []      Edema Management/Control [x]             Therapeutic Activities            [x]      Patient and Family Training/Education []             Other (comment): Frequency/Duration: Patient will be followed by physical therapy 5 times a week to address goals. Discharge Recommendations: Inpatient Rehab Further Equipment Recommendations for Discharge: tbd SUBJECTIVE:  
Patient stated ok.  OBJECTIVE DATA SUMMARY:  
HISTORY:   
No past medical history on file. No past surgical history on file. Prior Level of Function/Home Situation: independent Personal factors and/or comorbidities impacting plan of care: [facility] Home Situation Home Environment: Private residence # Steps to Enter: 2 One/Two Story Residence: One story Living Alone: No 
Support Systems: Friends \ neighbors, Other (comments)(roommates) Current DME Used/Available at Home: None Tub or Shower Type: Tub/Shower combination EXAMINATION/PRESENTATION/DECISION MAKING:  
Critical Behavior: 
Neurologic State: Alert, Confused Orientation Level: Oriented to person, Disoriented to place, Disoriented to situation, Disoriented to time Cognition: Decreased attention/concentration, Decreased command following, Impaired decision making, Poor safety awareness Safety/Judgement: Decreased awareness of environment, Decreased awareness of need for assistance, Decreased awareness of need for safety, Decreased insight into deficits, Fall prevention Hearing: 
 
Range Of Motion: 
AROM: Generally decreased, functional(RUE<LUE) Strength:   
Strength: Generally decreased, functional(RUE<LUE) Tone & Sensation:  
Tone: Abnormal(RUE low tone?) Sensation: Impaired Coordination: 
Coordination: Generally decreased, functional(RUE<LUE) Vision:  
Tracking: Unable to test secondary due to decreased visual attention Diplopia: No 
Functional Mobility: 
Bed Mobility: 
  
Supine to Sit: Minimum assistance; Additional time;Assist x1 Scooting: Minimum assistance; Additional time;Assist x1 Transfers: 
Sit to Stand: Minimum assistance; Additional time;Assist x2 Stand to Sit: Minimum assistance; Additional time;Assist x2 Bed to Chair: Moderate assistance; Additional time;Assist x2 Balance:  
Sitting: Impaired; With support Sitting - Static: Fair (occasional) Sitting - Dynamic: Fair (occasional) Standing: Impaired; With support Standing - Static: Constant support; Fair 
Standing - Dynamic : FairPain: 
Pain Scale 1: Numeric (0 - 10) Pain Intensity 1: 0 Activity Tolerance: VSS Please refer to the flowsheet for vital signs taken during this treatment. After treatment:  
[]     Patient left in no apparent distress sitting up in chair 
[x]     Patient left in no apparent distress in bed 
[x]     Call bell left within reach [x]     Nursing notified 
[x]     Caregiver present [x]     Bed alarm activated COMMUNICATION/EDUCATION:  
 The patients plan of care was discussed with: Occupational Therapist and Registered Nurse. Patient unable to participate in education at this time 
 
[]  Fall prevention education was provided and the patient/caregiver indicated understanding. []  Patient/family have participated as able in goal setting and plan of care. []  Patient/family agree to work toward stated goals and plan of care. []  Patient understands intent and goals of therapy, but is neutral about his/her participation. [x]  Patient is unable to participate in goal setting and plan of care. Thank you for this referral. 
Landon Lesch, PT , DPT Time Calculation: 44 mins

## 2018-11-13 NOTE — PROGRESS NOTES
Problem: Non-Violent Restraints Goal: Non-violent Restaints:Standard Interventions Outcome: Progressing Towards Goal 
Turn Q2, oral hygiene offered Q2, fit check and release Q2, pt with yung catheter Goal: Non-violent Restraints:Patient Interventions Outcome: Progressing Towards Goal 
Family presence, music therapy, close observation, low level of stimuli maintained, call button accessible Problem: Falls - Risk of 
Goal: *Absence of Falls Document Ronit Thorpejuvenal Fall Risk and appropriate interventions in the flowsheet. Outcome: Progressing Towards Goal 
Fall Risk Interventions: 
Mobility Interventions: Bed/chair exit alarm, Patient to call before getting OOB, PT Consult for mobility concerns, PT Consult for assist device competence, Strengthening exercises (ROM-active/passive) Mentation Interventions: Adequate sleep, hydration, pain control, Door open when patient unattended, Family/sitter at bedside, More frequent rounding, Toileting rounds, Bed/chair exit alarm Medication Interventions: Assess postural VS orthostatic hypotension, Evaluate medications/consider consulting pharmacy, Bed/chair exit alarm, Patient to call before getting OOB, Teach patient to arise slowly Elimination Interventions: Bed/chair exit alarm, Call light in reach, Patient to call for help with toileting needs, Toileting schedule/hourly rounds History of Falls Interventions: Bed/chair exit alarm, Investigate reason for fall, Room close to nurse's station Problem: Pressure Injury - Risk of 
Goal: *Prevention of pressure injury Document Jose Maria Scale and appropriate interventions in the flowsheet. Outcome: Progressing Towards Goal 
Pressure Injury Interventions: 
Sensory Interventions: Assess changes in LOC, Assess need for specialty bed, Check visual cues for pain, Discuss PT/OT consult with provider, Float heels, Turn and reposition approx.  every two hours (pillows and wedges if needed), Sit a 90-degree angle/use footstool if needed, Pressure redistribution bed/mattress (bed type), Minimize linen layers Moisture Interventions: Assess need for specialty bed, Internal/External urinary devices, Check for incontinence Q2 hours and as needed, Minimize layers Activity Interventions: PT/OT evaluation, Pressure redistribution bed/mattress(bed type), Increase time out of bed Mobility Interventions: Float heels, Pressure redistribution bed/mattress (bed type), Turn and reposition approx. every two hours(pillow and wedges), PT/OT evaluation Nutrition Interventions: Document food/fluid/supplement intake Friction and Shear Interventions: Foam dressings/transparent film/skin sealants, Lift sheet, Transferring/repositioning devices, Minimize layers

## 2018-11-13 NOTE — PROGRESS NOTES
Reason for Admission:   Patient presented to the ED at [facility] with was confusion, unable to walk, and fell  in the shower. Head CT : left occipital ICH. Transferred to [facility] for Neuro surgery eval.  
 
RRAT Score:     4 Plan for utilizing home health:    TBD Likelihood of Readmission:  Low Transition of Care Plan:     TBD Care manager met with patient's uncle to explain role and discuss transitions of care. Patient's uncle confirmed patient does not have a PCP or insurance. CM provided financial aid application as well as [facility] application and information packet. Per uncle patient is independent , works as a . He lives with room mates. Care management will follow for potential discharge needs. Denise Carroll RN,CRM Care Management Interventions MyChart Signup: No 
Discharge Durable Medical Equipment: No 
Physical Therapy Consult: Yes Occupational Therapy Consult: Yes Speech Therapy Consult: No 
Current Support Network: (Sisters: [place] [phone], Shar Garza [phone])

## 2018-11-13 NOTE — PROGRESS NOTES
Speech Pathology bedside swallow evaluation/discharge Patient: Cordell Ramos (26 y.o. male) Date: 11/13/2018 Primary Diagnosis: ICH (intracerebral hemorrhage) ([facility] [address] Precautions:     
 
ASSESSMENT : 
Based on the objective data described below, the patient presents with functional oropharyngeal swallow. Patient demonstrated timely and complete mastication of solids. Pharyngeal swallow initiation is suspected to be timely and hyolaryngeal elevation/excursion functional via palpation. No overt s/s aspiration with successive cup/straw sips of thin, puree or solid. Difficult to determine if language impairments are evident at this time vs lethargy vs language barrier. Will plan to f/u as patient becomes more alert to determine if language evaluation is indicated. Skilled therapy provided by a speech-language pathologist is not indicated at this time. PLAN : 
Recommendations: Once MD ok with advancing past clear liquids, Ok from SLP standpoint to advance to regular diet/ thin liquids. Straws ok Meds as tolerated 
slp to f/u as appropriate to determine need for language eval 
 
Discharge Recommendations: To Be Determined SUBJECTIVE:  
Patient stated I'm ok. OBJECTIVE:  
No past medical history on file. No past surgical history on file. Prior Level of Function/Home Situation:  
 Diet prior to admission: regular/thin Current Diet:  npo Cognitive and Communication Status: 
Neurologic State: Alert Orientation Level: Oriented to person Cognition: Decreased command following Perseveration: No perseveration noted Safety/Judgement: Not assessed Oral Assessment: 
Oral Assessment Labial: No impairment Dentition: Natural;Limited Oral Hygiene: (white coating on tongue) Lingual: No impairment Velum: Unable to visualize Mandible: No impairment P.O. Trials: 
Patient Position: (up in bed) Vocal quality prior to P.O.: No impairment Consistency Presented: Thin liquid; Solid;Puree; Ice chips How Presented: Successive swallows;Straw;Cup/sip; Self-fed/presented;SLP-fed/presented;Spoon Bolus Acceptance: No impairment Bolus Formation/Control: No impairment Propulsion: No impairment Oral Residue: None Initiation of Swallow: No impairment Laryngeal Elevation: Functional 
Aspiration Signs/Symptoms: None Pharyngeal Phase Characteristics: No impairment, issues, or problems Effective Modifications: None Cues for Modifications: None Oral Phase Severity: No impairment Pharyngeal Phase Severity : No impairment NOMS:  
The NOMS functional outcome measure was used to quantify this patient's level of swallowing impairment. Based on the NOMS, the patient was determined to be at level 7 for swallow function G Codes: In compliance with CMSs Claims Based Outcome Reporting, the following G-code set was chosen for this patient based the use of the NOMS functional outcome to quantify this patient's level of swallowing impairment. Using the NOMS, the patient was determined to be at level 7 for swallow function which correlates with the CH= 0% level of severity. Based on the objective assessment provided within this note, the current, goal, and discharge g-codes are as follows: 
 
Swallow  Swallowing: 
 Swallow Current Status CH= 0% 
 Swallow Goal Status CH= 0% 
 Swallow D/C Status CH= 0% NOMS Swallowing Levels: 
Level 1 (CN): NPO Level 2 (CM): NPO but takes consistency in therapy Level 3 (CL): Takes less than 50% of nutrition p.o. and continues with nonoral feedings; and/or safe with mod cues; and/or max diet restriction Level 4 (CK): Safe swallow but needs mod cues; and/or mod diet restriction; and/or still requires some nonoral feeding/supplements Level 5 (CJ): Safe swallow with min diet restriction; and/or needs min cues Level 6 (CI): Independent with p.o.; rare cues; usually self cues; may need to avoid some foods or needs extra time Level 7 (CH): Independent for all p.o. HOLLY. (2003). National Outcomes Measurement System (NOMS): Adult Speech-Language Pathology User's Guide. Pain: 
Pain Scale 1: Numeric (0 - 10) Pain Intensity 1: 0 After treatment:  
[] Patient left in no apparent distress sitting up in chair 
[x] Patient left in no apparent distress in bed 
[x] Call bell left within reach [x] Nursing notified 
[] Caregiver present 
[] Bed alarm activated COMMUNICATION/EDUCATION:  
The patients plan of care including findings, recommendations, and recommended diet changes were discussed with: Registered Nurse. 
[] Posted safety precautions in patient's room. [x] Patient/family have participated as able and agree with findings and recommendations. [] Patient is unable to participate in plan of care at this time. Thank you for this referral. 
Jeananne Saint, SLP Time Calculation: 19 mins

## 2018-11-13 NOTE — PROGRESS NOTES
Larry El / Pulmonary / Critical Care Assessment / Plan:   
 
Intracranial hemorrhage - ? Small AVM per NS - NIS to see EtOH abuse Thrombocytopenia 2/2 to EtOH abuse Low grade fever - started on rocephin 
 
--monitor in ICU 
--BP control 
--Keppra 
--treatment for EtOH withdrawal (CIWA / precedex) 
--goodie bag 
--electrolytes as needed 
--on rocephin empirically --pepcid / SCD's 
--NS following History / Subjective: 
Hospital Day: 5 - Interval history and notes reviewed 42 yo EtOH abuse confused after fall. Found to have [address]. Admitted to ICU Remains on precedex with prn ativan (CIWA) Follow up imaging possible vascular malformation Objective: 
Patient Vitals for the past 4 hrs: 
 BP Temp Pulse Resp SpO2 Weight 18 1012      84.4 kg (186 lb 1.1 oz)  
18 1000 104/65  61 17 96 %   
18 0907   (!) 59 16 96 %   
18 0800 101/64 97.9 °F (36.6 °C) 62 14 96 %   
18 0718 160/86  (!) 58     
18 0700 145/90  (!) 56 14 97 %  Temp (24hrs), Av.5 °F (36.9 °C), Min:97.9 °F (36.6 °C), Max:99.2 °F (37.3 °C) Intake/Output Summary (Last 24 hours) at 2018 1031 Last data filed at 2018 1000 Gross per 24 hour Intake 2054.23 ml Output 2950 ml Net -895.77 ml No results found for: Carole Stapleton Exam: 
Appears comfortable NAD Data:  
Interval lab and diagnostic data was reviewed. Interval radiology images were independently viewed and available reports were reviewed. Lab: 
Recent Labs 18 
0326 WBC 8.2 HGB 12.9 PLT 61*   
K 3.8 * CO2 18* BUN 10  
CREA 0.51* GLU 90  
CA 7.9*  
MG 1.8 PHOS 3.4 TBILI 1.9*  
SGOT 37 Lisa Evans MD

## 2018-11-13 NOTE — PROGRESS NOTES
Problem: Self Care Deficits Care Plan (Adult) Goal: *Acute Goals and Plan of Care (Insert Text) Occupational Therapy Goals Initiated 11/13/2018 1. Patient will perform self-feeding using LUE and RUE as gross motor stabilizer with moderate assistance within 7 day(s). 2.  Patient will perform upper body ADLs using LUE and RUE as gross motor stabilizer with moderate assistance within 7 day(s). 3.  Patient will perform lower body ADLs using LUE and RUE as gross motor stabilizer with moderate assistance within 7 day(s). 4.  Patient will perform toilet transfers to [facility] with moderate assistance within 7 day(s). 5.  Patient will perform all aspects of toileting with moderate assistancewithin 7 day(s). 6.  Patient will participate in upper extremity therapeutic exercise/activities with moderate assistance  for 5 minutes within 7 day(s). 7.  Patient will utilize energy conservation techniques during functional activities with verbal and tactile cues within 7 day(s). 8.  Patient will improve their Fugl Almaguer score by 5 points in prep for ADLs within 7 days. Occupational Therapy EVALUATION Patient: Anthony Herrera (30 y.o. male) Date: 11/13/2018 Primary Diagnosis: ICH (intracerebral hemorrhage) ([facility] [address] Precautions:  Fall, Bed Alarm ASSESSMENT : 
Based on the objective data described below, the patient presents with overall min A for bed mobility, mod-max A for functional mobility, and mod-max A for upper and lower body ADLs s/p admission for L parieto-occipital hemorrhage. Today, patient ADLs limited by impaired sitting and standing balance, decreased functional activity tolerance, limited ROM in RUE, impaired strength in RUE, generalized weakness, impaired sensation in RUE and impaired cognition (attention to task, command following, sequencing, problem solving, R inattention). Unable to fully assess vision 2* decreased visual attention.  Noted patient with slow processing of commands, unsure if d/t ICH or language barrier (patient Lao speaking). Patient unable to complete [place] assessment 2* impaired attention to task and command following. Patient will likely need IP rehab once medically stable in order to address the above deficits and to maximize functional independence prior to return home. Recommend with nursing patient to complete as able in order to maintain strength, endurance and independence: ADLs with supervision/setup, OOB to chair 3x/day and mobilizing to the [facility] for toileting with 2 assist (with gait belt). Thank you for your assistance. Patient will benefit from skilled intervention to address the above impairments. Patients rehabilitation potential is considered to be Fair Factors which may influence rehabilitation potential include:  
[]             None noted []             Mental ability/status []             Medical condition []             Home/family situation and support systems []             Safety awareness []             Pain tolerance/management 
[]             Other: PLAN : 
Recommendations and Planned Interventions: 
[x]               Self Care Training                  [x]        Therapeutic Activities [x]               Functional Mobility Training    [x]        Cognitive Retraining 
[x]               Therapeutic Exercises           [x]        Endurance Activities [x]               Balance Training                   [x]        Neuromuscular Re-Education [x]               Visual/Perceptual Training     [x]   Home Safety Training 
[x]               Patient Education                 [x]        Family Training/Education []               Other (comment): Frequency/Duration: Patient will be followed by occupational therapy 5 times a week to address goals. Discharge Recommendations: Inpatient Rehab Further Equipment Recommendations for Discharge: TBD - will continue to assess SUBJECTIVE:  
 Patient stated About 15 minutes away.  (referring to where he lives - not in context to conversation) OBJECTIVE DATA SUMMARY:  
HISTORY:  
No past medical history on file. No past surgical history on file. Prior Level of Function/Environment/Context: Patient with limited ability to give PLOF. Per chart review, patient was IND and working as a  PTA. Patient was living in 1 level home with roommates and was not using any DME. Home Situation Home Environment: Private residence # Steps to Enter: 2 One/Two Story Residence: One story Living Alone: No 
Support Systems: Friends \ neighbors, Other (comments)(roommates) Current DME Used/Available at Home: None Tub or Shower Type: Tub/Shower combination Hand dominance: RightEXAMINATION OF PERFORMANCE DEFICITS: 
Cognitive/Behavioral Status: 
Neurologic State: Alert;Confused Orientation Level: Oriented to person;Disoriented to place; Disoriented to situation;Disoriented to time Cognition: Decreased attention/concentration;Decreased command following; Impaired decision making;Poor safety awareness Perception: Cues to attend right visual field;Cues to attend to right side of body; Tactile;Verbal 
Perseveration: No perseveration noted Safety/Judgement: Decreased awareness of environment;Decreased awareness of need for assistance;Decreased awareness of need for safety;Decreased insight into deficits; Fall prevention Skin: Appears grossly intact Edema: mild in RUE Vision/Perceptual:   
Tracking: Unable to test secondary due to decreased visual attention Diplopia: No   
Range of Motion: In [id number] LUE - WFL 
RUE - limited shoulder flexion (~5*) AROM: Generally decreased, functional(RUE<LUE) Strength: In [address] [place], [place] [address] L  5/5 
R  2/5 Patient unable to hold RUE against gravity Strength: Generally decreased, functional(RUE<LUE) Coordination: 
Coordination: Generally decreased, functional(RUE<LUE) Fine Motor Skills-Upper: Left Intact; Right Impaired Gross Motor Skills-Upper: Left Intact; Right Impaired Tone & Sensation: 
Patient asked to lift RUE, lifted L, patient unable to verbalize any changes in sensation Tone: Abnormal(RUE low tone?) Sensation: Impaired Balance: 
Sitting: Impaired; With support Sitting - Static: Fair (occasional) Sitting - Dynamic: Fair (occasional) Standing: Impaired; With support Standing - Static: Constant support; Fair 
Standing - Dynamic : Fair Functional Mobility and Transfers for ADLs:Bed Mobility: 
Supine to Sit: Minimum assistance; Additional time;Assist x1 Scooting: Minimum assistance; Additional time;Assist x1 Transfers: 
Sit to Stand: Minimum assistance; Additional time;Assist x2 Stand to Sit: Minimum assistance; Additional time;Assist x2 Bed to Chair: Moderate assistance; Additional time;Assist x2 Toilet Transfer : Moderate assistance; Additional time;Assist x2(Infer) ADL Assessment: 
Feeding: Maximum assistance; Additional time Oral Facial Hygiene/Grooming: Moderate assistance; Additional time* Bathing: Moderate assistance; Additional time* Upper Body Dressing: Maximum assistance; Additional time* Lower Body Dressing: Moderate assistance; Additional time Toileting: Moderate assistance; Additional time* 
*Infer per obs of functional mobility, functional reach, BUE ROM, strength, balance and cogntition ADL Intervention and task modifications: 
Feeding Container Management: Total assistance (dependent) Utensil Management: Minimum assistance Food to Mouth: Minimum assistance Cues: Physical assistance;Verbal cues provided Patient able to doff socks, with extra time and moderate A, able to arlet socks, required A to arlet R sock only Lower Body Dressing Assistance Socks: Moderate assistance Leg Crossed Method Used: No 
Position Performed: Bending forward method;Seated edge of bed Cues: Doff;Don;Physical assistance; Tactile cues provided;Verbal cues provided Cognitive Retraining Orientation Retraining: Reorienting;Place;Situation;Time Organizing/Sequencing: Breaking task down Attention to Task: Single task Maintains Attention For (Time): 1 minute Following Commands: Follows one step commands/directions Safety/Judgement: Decreased awareness of environment;Decreased awareness of need for assistance;Decreased awareness of need for safety;Decreased insight into deficits; Fall prevention Cues: Tactile cues provided;Verbal cues provided Functional Measure: 
Barthel Index: 
 
Bathin Bladder: 5 Bowels: 5 Groomin Dressin Feedin Mobility: 5 Stairs: 0 Toilet Use: 5 Transfer (Bed to Chair and Back): 5 Total: 30 Barthel and G-code impairment scale: 
Percentage of impairment CH 
0% CI 
1-19% CJ 
20-39% CK 
40-59% CL 
60-79% CM 
80-99% CN 
100% Barthel Score 0-100 100 99-80 79-60 59-40 20-39 1-19 
 0 Barthel Score 0-20 20 17-19 13-16 9-12 5-8 1-4 0 The Barthel ADL Index: Guidelines 1. The index should be used as a record of what a patient does, not as a record of what a patient could do. 2. The main aim is to establish degree of independence from any help, physical or verbal, however minor and for whatever reason. 3. The need for supervision renders the patient not independent. 4. A patient's performance should be established using the best available evidence. Asking the patient, friends/relatives and nurses are the usual sources, but direct observation and common sense are also important. However direct testing is not needed. 5. Usually the patient's performance over the preceding 24-48 hours is important, but occasionally longer periods will be relevant. 6. Middle categories imply that the patient supplies over 50 per cent of the effort. 7. Use of aids to be independent is allowed. Mateus Lopez., Barthel, D.W. (5701). Functional evaluation: the Barthel Index. [facility] (14)2. MARIO Barker, Sherlyn Rubalcava, [facility]., [place], [address] (1999). Measuring the change indisability after inpatient rehabilitation; comparison of the responsiveness of the Barthel Index and Functional [place] Measure. Journal of Neurology, Neurosurgery, and Psychiatry, 66(4), 106-852. BRONSON Byrd, ANASTACIO Wang, & Brandy Cruz M.A. (2004.) Assessment of post-stroke quality of life in cost-effectiveness studies: The usefulness of the Barthel Index and the EuroQoL-5D. [facility], 13, 402-97 G codes: In compliance with CMSs Claims Based Outcome Reporting, the following G-code set was chosen for this patient based on their primary functional limitation being treated: The outcome measure chosen to determine the severity of the functional limitation was the Barthel Index with a score of 30/100 which was correlated with the impairment scale. ? Self Care:  
  - CURRENT STATUS: CL - 60%-79% impaired, limited or restricted  - GOAL STATUS: CK - 40%-59% impaired, limited or restricted  - D/C STATUS:  ---------------To be determined--------------- Occupational Therapy Evaluation Charge Determination History Examination Decision-Making LOW Complexity : Brief history review  LOW Complexity : 1-3 performance deficits relating to physical, cognitive , or psychosocial skils that result in activity limitations and / or participation restrictions  HIGH Complexity : Patient presents with comorbidities that affect occupational performance. Signifigant modification of tasks or assistance (eg, physical or verbal) with assessment (s) is necessary to enable patient to complete evaluation Based on the above components, the patient evaluation is determined to be of the following complexity level: LOW Pain: 
Pain Scale 1: Numeric (0 - 10) Pain Intensity 1: 0 Activity Tolerance:  
Fair, VSS 
 Please refer to the flowsheet for vital signs taken during this treatment. After treatment:  
[x] Patient left in no apparent distress sitting up in chair 
[] Patient left in no apparent distress in bed 
[x] Call bell left within reach [x] Nursing notified 
[] Caregiver present [x] Chair alarm activated COMMUNICATION/EDUCATION:  
The patients plan of care was discussed with: Physical Therapist and Registered Nurse. [x] Home safety education was provided and the patient/caregiver indicated understanding. [x] Patient/family have participated as able in goal setting and plan of care. [] Patient/family agree to work toward stated goals and plan of care. [] Patient understands intent and goals of therapy, but is neutral about his/her participation. [] Patient is unable to participate in goal setting and plan of care. This patients plan of care is appropriate for delegation to [facility]. Thank you for this referral. 
Jonas Arnold OT Time Calculation: 24 mins

## 2018-11-14 LAB
BACTERIA SPEC CULT: NORMAL
SERVICE CMNT-IMP: NORMAL

## 2018-11-14 PROCEDURE — 74011250637 HC RX REV CODE- 250/637: Performed by: INTERNAL MEDICINE

## 2018-11-14 PROCEDURE — 92523 SPEECH SOUND LANG COMPREHEN: CPT

## 2018-11-14 PROCEDURE — 74011000250 HC RX REV CODE- 250: Performed by: NEUROLOGICAL SURGERY

## 2018-11-14 PROCEDURE — 97530 THERAPEUTIC ACTIVITIES: CPT

## 2018-11-14 PROCEDURE — 77030034850

## 2018-11-14 PROCEDURE — 74011250636 HC RX REV CODE- 250/636: Performed by: NEUROLOGICAL SURGERY

## 2018-11-14 PROCEDURE — 97112 NEUROMUSCULAR REEDUCATION: CPT

## 2018-11-14 PROCEDURE — 74011000258 HC RX REV CODE- 258: Performed by: FAMILY MEDICINE

## 2018-11-14 PROCEDURE — 74011250637 HC RX REV CODE- 250/637: Performed by: FAMILY MEDICINE

## 2018-11-14 PROCEDURE — 74011250636 HC RX REV CODE- 250/636: Performed by: FAMILY MEDICINE

## 2018-11-14 PROCEDURE — 97535 SELF CARE MNGMENT TRAINING: CPT

## 2018-11-14 PROCEDURE — 74011000250 HC RX REV CODE- 250: Performed by: INTERNAL MEDICINE

## 2018-11-14 PROCEDURE — 74011250636 HC RX REV CODE- 250/636: Performed by: INTERNAL MEDICINE

## 2018-11-14 PROCEDURE — 74011000250 HC RX REV CODE- 250: Performed by: FAMILY MEDICINE

## 2018-11-14 PROCEDURE — 65610000006 HC RM INTENSIVE CARE

## 2018-11-14 RX ORDER — AMLODIPINE BESYLATE 5 MG/1
10 TABLET ORAL DAILY
Status: DISCONTINUED | OUTPATIENT
Start: 2018-11-14 | End: 2018-11-19 | Stop reason: HOSPADM

## 2018-11-14 RX ORDER — ACETAMINOPHEN 325 MG/1
650 TABLET ORAL
Status: DISCONTINUED | OUTPATIENT
Start: 2018-11-14 | End: 2018-11-19 | Stop reason: HOSPADM

## 2018-11-14 RX ORDER — HYDRALAZINE HYDROCHLORIDE 20 MG/ML
20 INJECTION INTRAMUSCULAR; INTRAVENOUS
Status: DISCONTINUED | OUTPATIENT
Start: 2018-11-14 | End: 2018-11-19 | Stop reason: HOSPADM

## 2018-11-14 RX ORDER — ACETAMINOPHEN 650 MG/1
650 SUPPOSITORY RECTAL
Status: DISCONTINUED | OUTPATIENT
Start: 2018-11-14 | End: 2018-11-19 | Stop reason: HOSPADM

## 2018-11-14 RX ORDER — LABETALOL HCL 20 MG/4 ML
20 SYRINGE (ML) INTRAVENOUS
Status: DISCONTINUED | OUTPATIENT
Start: 2018-11-14 | End: 2018-11-19 | Stop reason: HOSPADM

## 2018-11-14 RX ADMIN — Medication 5 ML: at 05:25

## 2018-11-14 RX ADMIN — ACETAMINOPHEN 650 MG: 325 TABLET ORAL at 10:16

## 2018-11-14 RX ADMIN — HYDRALAZINE HYDROCHLORIDE 20 MG: 20 INJECTION INTRAMUSCULAR; INTRAVENOUS at 00:06

## 2018-11-14 RX ADMIN — Medication 5 ML: at 14:00

## 2018-11-14 RX ADMIN — LABETALOL HYDROCHLORIDE 20 MG: 5 INJECTION, SOLUTION INTRAVENOUS at 01:01

## 2018-11-14 RX ADMIN — FOLIC ACID: 5 INJECTION, SOLUTION INTRAMUSCULAR; INTRAVENOUS; SUBCUTANEOUS at 09:13

## 2018-11-14 RX ADMIN — LABETALOL HYDROCHLORIDE 20 MG: 5 INJECTION, SOLUTION INTRAVENOUS at 10:05

## 2018-11-14 RX ADMIN — AMLODIPINE BESYLATE 10 MG: 5 TABLET ORAL at 11:55

## 2018-11-14 RX ADMIN — SODIUM CHLORIDE 75 ML/HR: 900 INJECTION, SOLUTION INTRAVENOUS at 00:10

## 2018-11-14 RX ADMIN — SODIUM CHLORIDE 5 MG/HR: 900 INJECTION, SOLUTION INTRAVENOUS at 03:39

## 2018-11-14 RX ADMIN — SODIUM CHLORIDE 500 MG: 900 INJECTION, SOLUTION INTRAVENOUS at 17:01

## 2018-11-14 RX ADMIN — SODIUM CHLORIDE 500 MG: 900 INJECTION, SOLUTION INTRAVENOUS at 05:24

## 2018-11-14 RX ADMIN — HYDRALAZINE HYDROCHLORIDE 20 MG: 20 INJECTION INTRAMUSCULAR; INTRAVENOUS at 10:37

## 2018-11-14 RX ADMIN — FAMOTIDINE 20 MG: 10 INJECTION, SOLUTION INTRAVENOUS at 09:20

## 2018-11-14 RX ADMIN — ACETAMINOPHEN 650 MG: 325 TABLET ORAL at 15:51

## 2018-11-14 RX ADMIN — FAMOTIDINE 20 MG: 10 INJECTION, SOLUTION INTRAVENOUS at 21:35

## 2018-11-14 RX ADMIN — Medication 5 ML: at 21:39

## 2018-11-14 NOTE — PROGRESS NOTES
Bedside and Verbal shift change report given to [address]  (oncoming nurse) by Brandi Rehman RN (offgoing nurse). Report included the following information SBAR, Kardex, ED Summary, Intake/Output, MAR, Recent Results, Cardiac Rhythm NSR  and Alarm Parameters . 2000: Assumed care of the pt. Pt resting in bed with family in the room, complaining of headache. Ativan given per [facility] protocol. Reassessed pt, headache has improved. 0000: Pt SBP>140, given hydralazine. 0100: Pt SBP remains >140, given labetolol. 8008: Paged neurosurgery to discuss pt HTN. Received orders to start cardene gtt. Shift summary: Pt remained in bed entire shift. Cardene titrated for BP control. Medicated with 1 mg ativan x1 per [place].

## 2018-11-14 NOTE — PROGRESS NOTES
Problem: Mobility Impaired (Adult and Pediatric) Goal: *Acute Goals and Plan of Care (Insert Text) Physical Therapy Goals Initiated 11/13/2018 1. Patient will move from supine to sit and sit to supine , scoot up and down and roll side to side in bed with supervision/set-up within 7 day(s). 2.  Patient will transfer from bed to chair and chair to bed with minimal assistance/contact guard assist using the least restrictive device within 7 day(s). 3.  Patient will perform sit to stand with minimal assistance/contact guard assist within 7 day(s). 4.  Patient will ambulate with minimal assistance/contact guard assist for 50 feet with the least restrictive device within 7 day(s). 6.  Patient will complete Alcazar Balance Test within 7 days. physical Therapy TREATMENT Patient: Naila Bass (1 y.o. male) Date: 11/14/2018 Diagnosis: ICH (intracerebral hemorrhage) (HCC) ICH (intracerebral hemorrhage) ([facility] [address] Precautions: Fall, Bed Alarm Chart, physical therapy assessment, plan of care and goals were reviewed. ASSESSMENT: 
Pt cleared by RN for mobility. Received sitting in recliner with OT present for co-treat due to anticipation of need for 2 skilled therapists to complete mobility. Pt alert, following simple commands with fair/ good consistency. Presents with sensory/ motor defiicts R side. Demo grossly 3-/5 R knee ext and ankle DF with repeated cues and use of visual feedback. Pt tolerated two standing trials with min A x 2 for lift/ balance/ guarding R knee to reach stand. Required stabilization R knee in stance, cues for upright posture and R LE ext, min A x 2 to maintain static stand. Initiated pre-gait stepping activity with max A x 1 and mod A of another for completing one step forward/ back. Required extra time to initiate step on L, assist for R LE mgmt stance/ swing to minimally clear floor. Pt appeared to fatigue with activity, vitals stable throughout. Noted R inattention, good response to cues but does not sustain. Pt will benefit from follow up inpt rehab at d/c. Progression toward goals: 
[x]    Improving appropriately and progressing toward goals 
[]    Improving slowly and progressing toward goals 
[]    Not making progress toward goals and plan of care will be adjusted PLAN: 
Patient continues to benefit from skilled intervention to address the above impairments. Continu-e treatment per established plan of care. Discharge Recommendations:  Rehab Further Equipment Recommendations for Discharge: To be determined SUBJECTIVE:  
Patient stated I need more water.  OBJECTIVE DATA SUMMARY:  
Critical Behavior: 
Neurologic State: Alert Orientation Level: Oriented X4 Cognition: Decreased attention/concentration, Decreased command following, Follows commands, Poor safety awareness Safety/Judgement: Decreased awareness of environment, Decreased awareness of need for assistance, Decreased awareness of need for safety, Decreased insight into deficits, Fall prevention Functional Mobility Training: 
Bed Mobility: 
  
  
  
  
  
  
Transfers: 
Sit to Stand: Minimum assistance;Assist x2(guarding R knee, R UE support, min A x 2 for lift/ balance) Stand to Sit: Additional time;Minimum assistance;Assist x2(repeated cues to initiate) Balance: 
Sitting: Impaired Sitting - Static: Fair (occasional) Sitting - Dynamic: Fair (occasional) Standing: Impaired; With support Standing - Static: Fair Standing - Dynamic : PoorAmbulation/Gait Training: 
  
  
  
  
  
  
  
  
  
  
  
  
  
  
  
  
  
  
Stairs: 
  
  
   
 
Neuro Re-Education: 
 
Therapeutic Exercises:  
Passive stretch R hamstrings Pain: 
Pain Scale 1: Numeric (0 - 10) Pain Intensity 1: 0 Pain Location 1: Head 
Pain Orientation 1: Anterior Pain Description 1: Aching Pain Intervention(s) 1: Medication (see MAR) Activity Tolerance: Pt fatigued quickly with standing Please refer to the flowsheet for vital signs taken during this treatment. After treatment:  
[x]    Patient left in no apparent distress sitting up in chair; chair alarm activated 
[]    Patient left in no apparent distress in bed 
[x]    Call bell left within reach [x]    Nursing notified 
[]    Caregiver present 
[]    Bed alarm activated COMMUNICATION/COLLABORATION:  
The patients plan of care was discussed with: Occupational Therapist and Registered Nurse Tremaine Calderon, PT Time Calculation: 20 mins

## 2018-11-14 NOTE — PROGRESS NOTES
Bedside and Verbal shift change report given to Kalpesh WILLIAM (oncoming nurse) by Lakia Avila (offgoing nurse). Report included the following information SBAR.  
 
0800: Pt A&OX4 with decreased concentration/attention, room air, pt up to chair, off cardene. 0900: Neuro interventional team at bedside assessing pt. 1030: Family praying with pt at bedside. 1200: Reassessment, no changes. 1600: Reassessment, no changes. SHIFT SUMMARY: Pt A&OX4, on room air, PRN BP meds given, cardene off. Bedside and Verbal shift change report given to [address] [place] [address] (oncoming nurse) by Elizabeth Alberto (offgoing nurse). Report included the following information SBAR.

## 2018-11-14 NOTE — PROGRESS NOTES
[place] [place] / Pulmonary / Critical Care Assessment / Plan:   
 
Intracranial hemorrhage - ? Small AVM per NS - NIS following Htn EtOH abuse Thrombocytopenia 2/2 to EtOH abuse Low grade fever - started on rocephin --BP control - s/p prn hydralazine - started on cardene 
--Keppra 
--treatment for EtOH withdrawal (CIWA / precedex as needed) 
--goodie bag 
--electrolytes as needed 
--on rocephin empirically - completed  
--pepcid / SCD's 
--NS following History / Subjective: 
Hospital Day: 6 - Interval history and notes reviewed 40 yo EtOH abuse confused after fall. Found to have [address]. Admitted to ICU Off precedex currently but had to go on cardene over night for bp that did not respond to prn medications. NIS following and plan for angio at some time to eval for possible vascular malformation Objective: 
Patient Vitals for the past 4 hrs: 
 BP Temp Pulse Resp SpO2 Weight 18 0800 117/77 97.6 °F (36.4 °C) 82 16 95 %   
18 0756  100.1 °F (37.8 °C)    84.6 kg (186 lb 8.2 oz)  
18 0700 124/85  98 21 96 %  Temp (24hrs), Av.4 °F (36.9 °C), Min:97.6 °F (36.4 °C), Max:100.1 °F (37.8 °C) Intake/Output Summary (Last 24 hours) at 2018 1016 Last data filed at 2018 0700 Gross per 24 hour Intake 3343.91 ml Output 1570 ml Net 1773.91 ml No results found for: Art Baptise Exam: 
Appears comfortable NAD Data:  
Interval lab and diagnostic data was reviewed. Interval radiology images were independently viewed and available reports were reviewed. Lab: 
Recent Labs 18 
0326 WBC 8.2 HGB 12.9 PLT 61*   
K 3.8 * CO2 18* BUN 10  
CREA 0.51* GLU 90  
CA 7.9*  
MG 1.8 PHOS 3.4 TBILI 1.9*  
SGOT 37 Kirti Colunga MD

## 2018-11-14 NOTE — PROGRESS NOTES
[employer] Patient appeared to be sleeping. Left my number with RN Yoel Benedict, [facility] Certified  
[phone]

## 2018-11-14 NOTE — PROGRESS NOTES
Problem: Self Care Deficits Care Plan (Adult) Goal: *Acute Goals and Plan of Care (Insert Text) Occupational Therapy Goals Initiated 11/13/2018 1. Patient will perform self-feeding using LUE and RUE as gross motor stabilizer with moderate assistance within 7 day(s). 2.  Patient will perform upper body ADLs using LUE and RUE as gross motor stabilizer with moderate assistance within 7 day(s). 3.  Patient will perform lower body ADLs using LUE and RUE as gross motor stabilizer with moderate assistance within 7 day(s). 4.  Patient will perform toilet transfers to [facility] with moderate assistance within 7 day(s). 5.  Patient will perform all aspects of toileting with moderate assistancewithin 7 day(s). 6.  Patient will participate in upper extremity therapeutic exercise/activities with moderate assistance  for 5 minutes within 7 day(s). 7.  Patient will utilize energy conservation techniques during functional activities with verbal and tactile cues within 7 day(s). 8.  Patient will improve their Fugl Almaguer score by 5 points in prep for ADLs within 7 days. Occupational Therapy TREATMENT Patient: Hedy Bowers (88 y.o. male) Date: 11/14/2018 Diagnosis: ICH (intracerebral hemorrhage) ([facility]) ICH (intracerebral hemorrhage) ([facility] [address] Precautions: Fall, Bed Alarm Chart, occupational therapy assessment, plan of care, and goals were reviewed. ASSESSMENT: 
Patient cleared by RN to be seen for OT session, received sitting up in chair. Patient seen with PT 2* need for 2 clinicians to maximize functional outcomes and safety.  Patient noted to require significant multimodal cues for ADLs and movement tasks 2* significant R inattention, RUE weakness, impaired sensation, impaired balance, generalized weakness, decreased functional activity tolerance and cognitive impairments (attention to task, command following, processing speed/problem solving, etc). Continue to decipher language barrier vs. slow processing/ attention to task. Patient left in chair with call bell in reach, RN aware after session. Patient would benefit from IP rehab once medically cleared for D/C. Recommend with nursing patient to complete as able in order to maintain strength, endurance and independence: ADLs with supervision/setup, OOB to chair 3x/day and mobilizing to the [facility] for toileting with 3 assist (3rd for line management). Thank you for your assistance. Progression toward goals: 
[]       Improving appropriately and progressing toward goals [x]       Improving slowly and progressing toward goals 
[]       Not making progress toward goals and plan of care will be adjusted PLAN: 
Patient continues to benefit from skilled intervention to address the above impairments. Continue treatment per established plan of care. Discharge Recommendations:  Inpatient Rehab Further Equipment Recommendations for Discharge:  TBD by rehab SUBJECTIVE:  
Patient stated Can you leave the TV on. OBJECTIVE DATA SUMMARY:  
Cognitive/Behavioral Status: 
Neurologic State: Alert Orientation Level: Oriented X4 Cognition: Decreased attention/concentration;Decreased command following; Impaired decision making;Poor safety awareness Perception: Cues to attend to right side of body;Cues to maintain midline in sitting;Cues to maintain midline in standing;Cues to attend right visual field; Tactile;Verbal 
Perseveration: No perseveration noted Safety/Judgement: Decreased awareness of environment;Decreased awareness of need for assistance;Decreased insight into deficits; Decreased awareness of need for safety; Fall prevention Functional Mobility and Transfers for ADLs:Bed Mobility: 
Scooting: Minimum assistance Transfers: 
Sit to Stand: Minimum assistance; Additional time;Assist x2 Balance: 
Sitting: Impaired Sitting - Static: Fair (occasional) Sitting - Dynamic: Fair (occasional) Standing: Impaired; With support Standing - Static: Fair Standing - Dynamic : Poor ADL Intervention: 
Feeding Drink to Mouth: Minimum assistance(using BUE) Cues: Physical assistance; Tactile cues provided Grooming Washing Face: Stand-by assistance Brushing Teeth: Moderate assistance(able to brush teeth, A for all other aspects of task) Cues: Physical assistance; Tactile cues provided;Verbal cues provided Cognitive Retraining Safety/Judgement: Decreased awareness of environment;Decreased awareness of need for assistance;Decreased insight into deficits; Decreased awareness of need for safety; Fall prevention Neuro Re-Education: 
Patient oral care items set-up on tray, patient able to complete simple rote task of brushing teeth, however needed assist for s/u of brush and manipulation of tools throughout session. Patient required min-mod A for sit<>stand and [place] assist to use LUE to assist RUE with movement tasks. Patient unable to use RUE as GM stabilizer at this time. Patient required max Ax2 to shift weight R/L on feet to take steps and bear weight through RUE/RLE. Pain: 
Pain Scale 1: Numeric (0 - 10) Pain Intensity 1: 0 Pain Location 1: Head 
Pain Orientation 1: Anterior Pain Description 1: Aching Pain Intervention(s) 1: Medication (see MAR) Activity Tolerance:  
Fair, VSS Please refer to the flowsheet for vital signs taken during this treatment. After treatment:  
[x] Patient left in no apparent distress sitting up in chair 
[] Patient left in no apparent distress in bed 
[x] Call bell left within reach [x] Nursing notified 
[] Caregiver present [x] Chair alarm activated COMMUNICATION/COLLABORATION:  
The patients plan of care was discussed with: Physical Therapist and Registered Nurse Maxime Parsons OT Time Calculation: 35 mins

## 2018-11-14 NOTE — PROGRESS NOTES
Problem: Communication Impaired (Adult) Goal: *Acute Goals and Plan of Care (Insert Text) Speech pathology goals initiated 11/14/2018 1. Patient will respond to simple/personal yes/no questions with 85% accuracy given no more than 1 repetition within 7 days 2. Patient will follow 1-step commands with 90% accuracy given no more than 1 repetition within 7 days 3. Patient will complete 1-word sentence completions with 75% accuracy given moderate cues within 7 days Speech LAnguage Pathology evaluation Patient: Hedy Bowers (89 y.o. male) Date: 11/14/2018 Primary Diagnosis: ICH (intracerebral hemorrhage) ([facility] [address] Precautions:  Fall, Bed Alarm ASSESSMENT : 
Based on the objective data described below, the patient presents with suspected moderate receptive and expressive language deficits that are further impacted by language barrier and decreased attention to task.  assist in language evaluation. Patient with some difficulty reliably responding to simple yes/no questions and following 1-step commands. Perseveration noted between tasks. Patient able to express basic wants/needs but with structured tasks, had difficulty completing basic sentence completions and completing automatic sequences without cueing. Language barrier at least having some impact on accuracy levels; however, patient feeling as though it is harder to formulate thoughts and he is not at baseline. Patient will benefit from further assessment and treatment of language. Patient will benefit from skilled intervention to address the above impairments. Patients rehabilitation potential is considered to be Excellent Factors which may influence rehabilitation potential include:  
[]              None noted []              Mental ability/status [x]              Medical condition []              Home/family situation and support systems []              Safety awareness []              Pain tolerance/management 
[]              Other: PLAN : 
Recommendations and Planned Interventions: SLP to follow for language treatment Frequency/Duration: Patient will be followed by speech-language pathology 2-3 times a week to address goals. Discharge Recommendations: Rehab SUBJECTIVE:  
Patient alert, up in chair. Diet advanced from clears to regular. No concerns for swallowing but needs assistance OBJECTIVE:  
No past medical history on file. No past surgical history on file. Prior Level of Function/Home Situation:  
Home Situation Home Environment: Private residence # Steps to Enter: 2 One/Two Story Residence: One story Living Alone: No 
Support Systems: Friends \ neighbors, Other (comments)(roommates) Current DME Used/Available at Home: None Tub or Shower Type: Tub/Shower combinationMental Status: 
Neurologic State: Alert Orientation Level: Oriented X4 Cognition: Decreased attention/concentration, Decreased command following, Follows commands, Poor safety awareness Perception: Cues to attend right visual field, Cues to attend to right side of body, Tactile, Verbal 
Perseveration: No perseveration noted Safety/Judgement: Decreased awareness of environment, Decreased awareness of need for assistance, Decreased awareness of need for safety, Decreased insight into deficits, Fall prevention Motor Speech: 
  
Language Comprehension and Expression: Auditory Comprehension Auditory Impairment: Yes Response to Basic Yes/No Questions (%): 67 % One-Step Basic Commands (%): 67 % Interfering Components: Processing speed; Environmental distractions; Motor planning Effective Techniques: Extra processing time;Pausing;Repetition Verbal Expression Primary Mode of Expression: Verbal 
Initiation: No impairment Automatic Speech Task: Impaired (comment) Naming: Impaired Confrontation (%): (87) Sentence Completion: Impaired Word level (%): (50) Speech Characteristics: Perseveration; Word retrieval 
Interfering Components: Attention Overall Impairment: Moderate G Codes: In compliance with CMSs Claims Based Outcome Reporting, the following G-code set was chosen for this patient based the use of the NOMS functional outcome to quantify this patient's level of 4 impairment. Using the NOMS, the patient was determined to be at level 4 for receptive language which correlates with the CK= 40-59% level of severity. Based on the objective assessment provided within this note, the current, goal, and discharge g-codes are as follows: Comprehension   Lang Comp Current Status CK= 40-59% [address] Goal Status CI= 1-19% The NOMS functional outcome measure was used to quantify this patient's level of receptive language impairment. Based on the NOMS, the patient was determined to be at level 4 for spoken language comprehension. NOMS Spoken Language Comprehension: 
Level 1 (CN): Unable to follow directions or respond to simple yes/no Level 2 (CM): Can follow directions & respond to simple y/n in context with max cues. Level 3 (CL): Responds to simple y/n; follows simple commands w/mod cues out of context Level 4 (CK): Responds to y/n; occasionally follows simple directions without cues. Mod cues for complex info. Understands limited conversation about routine activities with familiar communication partner Level 5 (Ally November): Understands communication in structured conversation with familiar and unfamiliar partners. Min cues for complex information. Occasionally initiates use of compensatory strategies Level 6 (CI): Limitations still apparent in voc/social situations. Rarely cued for complex info. Level 7 (CH): Independently participates in vocational, avocational, and social situations. HOLLY. (2003). National Outcomes Measurement System (NOMS): Adult Speech-Language Pathology User's Guide. Pain: Pain Scale 1: Numeric (0 - 10) Pain Intensity 1: 0 Pain Location 1: Head After treatment:  
[]              Patient left in no apparent distress sitting up in chair 
[x]              Patient left in no apparent distress in bed 
[x]              Call bell left within reach [x]              Nursing notified 
[]              Caregiver present 
[]              Bed alarm activated COMMUNICATION/EDUCATION:  
The patients plan of care including recommendations and planned interventions was discussed with: Registered Nurse. Patient was educated regarding His deficit(s) of aphasia as this relates to His diagnosis of ICH. He demonstrated Fair understanding as evidenced by verbalization. []  Patient/family have participated as able in goal setting and plan of care. [x]  Patient/family agree to work toward stated goals and plan of care. [x]  Patient understands intent and goals of therapy, but is neutral about his/her participation. []  Patient is unable to participate in goal setting and plan of care. Thank you for this referral. 
Beata Zarate SLP Time Calculation: 25 mins

## 2018-11-14 NOTE — PROGRESS NOTES
Hospitalist Progress Note Inocencio Denson MD 
Answering service: [phone] OR [phone] from in house phone Date of Service:  2018 NAME:  Beti Giron 
:  1980 MRN:  [id number] Admission Summary:  
 
Patient is a 41year old  man who is heavy alcoholic brought to the ER for confusion. He had fallen the night prior. He was admitted to the ICU for ICH and alcohol withdrawal.  
 
 
Interval history / Subjective: More awake, cutting off precedex off now Need IV Cardene to control BP wean of as tolerates to oral meds Not a surgical candidate for ICH with CLD with low plt Assessment & Plan:  
 
Acute metabolic encephalopathy due to acute left parietal intraparenchymal hemorrhage 
- non traumatic as suspected by NS and also etoh withdrawal 
- Seen by NSGY and requested eval from NIS ( NOTED EVALUATION )  
  
Possibly non traumatic ICH associated with brain compression and vasogenic edema  
-Neurosurgery following: no surgical intervention or steroids indicated 
-repeat CT stable 
-CTA head - 1. No interval change in size and appearance of large left posterior parietal 
parenchymal hematoma. Small approximate 1 cm tuft of vessels along the posterior superior margin of the hematoma. Possible vascular malformation No other vascular abnormalities demonstrated. 4. No evidence of spots sign to suggest ongoing or progressive hemorrhage. 
-Continue keppra, neuro check and ICU monitoring. 
  
UTI POA : Completed course 
  
Alcohol withdrawal 
-On CIWA protocol. Goodie bag. 
  
Thrombocytopenia 
-Due likely to ETOH. Monitor  
-S/p 3 units of platelets. Plt  
  
Hyponatremia due to alcohol: improved. 
  
Code status: Full DVT prophylaxis: SCD Care Plan discussed with: Patient/Family and Nurse Disposition: TBD once off Cardene and more stable neurologically Hospital Problems  Date Reviewed: 2018 Codes Class Noted POA * (Principal) ICH (intracerebral hemorrhage) ([facility] [address] ICD-10-CM: I61.9 ICD-9-CM: 861  11/9/2018 Unknown Review of Systems:  
Review of systems not obtained due to patient factors. Vital Signs:  
 Last 24hrs VS reviewed since prior progress note. Most recent are: 
Visit Vitals /77 Pulse 86 Temp 97.6 °F (36.4 °C) Resp 23 Ht 5' 8\" (1.727 m) Wt 84.6 kg (186 lb 8.2 oz) SpO2 96% BMI 28.36 kg/m² Intake/Output Summary (Last 24 hours) at 11/14/2018 1209 Last data filed at 11/14/2018 0800 Gross per 24 hour Intake 4175.71 ml Output 1345 ml Net 2830.71 ml Physical Examination:  
 
 
     
Constitutional:  No acute distress, ENT:  Oral mucous moist  
Resp:  CTA bilaterally. CV:  Regular rhythm, normal rate, GI:  Soft, non distended, non tender. bs= Musculoskeletal:  No edema, warm, 2+ pulses throughout Neurologic:  Moves all extremities. AAOx 3 Data Review:  
 I personally reviewed  Image and labs Labs:  
 
Recent Labs 11/13/18 
0326 WBC 8.2 HGB 12.9 HCT 39.6 PLT 61* Recent Labs 11/13/18 0326   
K 3.8 * CO2 18* BUN 10  
CREA 0.51* GLU 90  
CA 7.9*  
MG 1.8 PHOS 3.4 Recent Labs 11/13/18 
0326 SGOT 37 ALT 23 AP 99 TBILI 1.9* TP 8.1 ALB 2.6*  
GLOB 5.5* No results for input(s): INR, PTP, APTT in the last 72 hours. No lab exists for component: INREXT, INREXT No results for input(s): FE, TIBC, PSAT, FERR in the last 72 hours. No results found for: FOL, RBCF No results for input(s): PH, PCO2, PO2 in the last 72 hours. No results for input(s): CPK, CKNDX, TROIQ in the last 72 hours. No lab exists for component: CPKMB Lab Results Component Value Date/Time  Cholesterol, total 82 11/09/2018 05:23 PM  
 HDL Cholesterol 31 11/09/2018 05:23 PM  
 LDL, calculated 39.6 11/09/2018 05:23 PM  
 Triglyceride 57 11/09/2018 05:23 PM  
 CHOL/HDL Ratio 2.6 11/09/2018 05:23 PM  
 
No results found for: Bear Sellers Lab Results Component Value Date/Time Color ORANGE 11/09/2018 01:07 PM  
 Appearance CLOUDY (A) 11/09/2018 01:07 PM  
 Specific gravity 1.025 11/09/2018 01:07 PM  
 pH (UA) 6.5 11/09/2018 01:07 PM  
 Protein 300 (A) 11/09/2018 01:07 PM  
 Glucose NEGATIVE  11/09/2018 01:07 PM  
 Ketone 15 (A) 11/09/2018 01:07 PM  
 Urobilinogen >8.0 (H) 11/09/2018 01:07 PM  
 Nitrites POSITIVE (A) 11/09/2018 01:07 PM  
 Leukocyte Esterase SMALL (A) 11/09/2018 01:07 PM  
 Epithelial cells FEW 11/09/2018 01:07 PM  
 Bacteria NEGATIVE  11/09/2018 01:07 PM  
 WBC 5-10 11/09/2018 01:07 PM  
 RBC 20-50 11/09/2018 01:07 PM  
 
 
 
Medications Reviewed:  
 
Current Facility-Administered Medications Medication Dose Route Frequency  niCARdipine (CARDENE) 25 mg in 0.9% sodium chloride 250 mL infusion  0-15 mg/hr IntraVENous TITRATE  hydrALAZINE (APRESOLINE) 20 mg/mL injection 20 mg  20 mg IntraVENous Q6H PRN  
 labetalol (NORMODYNE;TRANDATE) injection 20 mg  20 mg IntraVENous Q6H PRN  
 amLODIPine (NORVASC) tablet 10 mg  10 mg Oral DAILY  acetaminophen (TYLENOL) tablet 650 mg  650 mg Oral Q4H PRN Or  
 acetaminophen (TYLENOL) solution 650 mg  650 mg Per NG tube Q4H PRN  Or  
 acetaminophen (TYLENOL) suppository 650 mg  650 mg Rectal Q4H PRN  
 LORazepam (ATIVAN) injection 1 mg  1 mg IntraVENous Q1H PRN  
 LORazepam (ATIVAN) injection 2 mg  2 mg IntraVENous Q1H PRN  
 ondansetron (ZOFRAN) injection 4 mg  4 mg IntraVENous Q6H PRN  
 naloxone (NARCAN) injection 0.4 mg  0.4 mg IntraVENous PRN  
 0.9% sodium chloride infusion  75 mL/hr IntraVENous CONTINUOUS  
 levETIRAcetam (KEPPRA) 500 mg in 0.9% sodium chloride 100 mL IVPB  500 mg IntraVENous Q12H  
 famotidine (PF) (PEPCID) 20 mg in sodium chloride 0.9% 10 mL injection  20 mg IntraVENous Q12H  
 0.9% sodium chloride 8,356 mL with folic acid 1 mg, thiamine 100 mg, mvi, adult no. 4 with vit K 10 mL infusion   IntraVENous DAILY  0.9% sodium chloride infusion 250 mL  250 mL IntraVENous PRN  
 SALINE PERIPHERAL FLUSH Q8H soln 5 mL  5 mL InterCATHeter Q8H  
 
______________________________________________________________________ EXPECTED LENGTH OF STAY: 3d 4h 
ACTUAL LENGTH OF STAY:          5 Ann Marie Weston MD

## 2018-11-14 NOTE — PROGRESS NOTES
Courtney Rounded on patient with RN. Patient appeared to be sleeping and hard to wake up. Adrian f/uMatthew Perry [place], [place] Certified

## 2018-11-15 LAB
ALBUMIN SERPL-MCNC: 3 G/DL (ref 3.5–5)
ALBUMIN/GLOB SERPL: 0.5 {RATIO} (ref 1.1–2.2)
ALP SERPL-CCNC: 119 U/L (ref 45–117)
ALT SERPL-CCNC: 37 U/L (ref 12–78)
ANION GAP SERPL CALC-SCNC: 10 MMOL/L (ref 5–15)
AST SERPL-CCNC: 51 U/L (ref 15–37)
BASOPHILS # BLD: 0.1 K/UL (ref 0–0.1)
BASOPHILS NFR BLD: 1 % (ref 0–1)
BILIRUB SERPL-MCNC: 2 MG/DL (ref 0.2–1)
BUN SERPL-MCNC: 6 MG/DL (ref 6–20)
BUN/CREAT SERPL: 12 (ref 12–20)
CALCIUM SERPL-MCNC: 8.4 MG/DL (ref 8.5–10.1)
CHLORIDE SERPL-SCNC: 107 MMOL/L (ref 97–108)
CO2 SERPL-SCNC: 23 MMOL/L (ref 21–32)
CREAT SERPL-MCNC: 0.52 MG/DL (ref 0.7–1.3)
DIFFERENTIAL METHOD BLD: ABNORMAL
EOSINOPHIL # BLD: 0.1 K/UL (ref 0–0.4)
EOSINOPHIL NFR BLD: 1 % (ref 0–7)
ERYTHROCYTE [DISTWIDTH] IN BLOOD BY AUTOMATED COUNT: 12.4 % (ref 11.5–14.5)
GLOBULIN SER CALC-MCNC: 5.7 G/DL (ref 2–4)
GLUCOSE SERPL-MCNC: 99 MG/DL (ref 65–100)
HCT VFR BLD AUTO: 39.3 % (ref 36.6–50.3)
HGB BLD-MCNC: 13.3 G/DL (ref 12.1–17)
IMM GRANULOCYTES # BLD: 0 K/UL
IMM GRANULOCYTES NFR BLD AUTO: 0 %
LYMPHOCYTES # BLD: 2.4 K/UL (ref 0.8–3.5)
LYMPHOCYTES NFR BLD: 22 % (ref 12–49)
MAGNESIUM SERPL-MCNC: 1.9 MG/DL (ref 1.6–2.4)
MCH RBC QN AUTO: 36.5 PG (ref 26–34)
MCHC RBC AUTO-ENTMCNC: 33.8 G/DL (ref 30–36.5)
MCV RBC AUTO: 108 FL (ref 80–99)
MONOCYTES # BLD: 2.1 K/UL (ref 0–1)
MONOCYTES NFR BLD: 20 % (ref 5–13)
NEUTS BAND NFR BLD MANUAL: 1 % (ref 0–6)
NEUTS SEG # BLD: 6 K/UL (ref 1.8–8)
NEUTS SEG NFR BLD: 55 % (ref 32–75)
NRBC # BLD: 0 K/UL (ref 0–0.01)
NRBC BLD-RTO: 0 PER 100 WBC
PHOSPHATE SERPL-MCNC: 3.7 MG/DL (ref 2.6–4.7)
PLATELET # BLD AUTO: 101 K/UL (ref 150–400)
PMV BLD AUTO: 11.8 FL (ref 8.9–12.9)
POTASSIUM SERPL-SCNC: 3.6 MMOL/L (ref 3.5–5.1)
PROT SERPL-MCNC: 8.7 G/DL (ref 6.4–8.2)
RBC # BLD AUTO: 3.64 M/UL (ref 4.1–5.7)
RBC MORPH BLD: ABNORMAL
SODIUM SERPL-SCNC: 140 MMOL/L (ref 136–145)
WBC # BLD AUTO: 10.7 K/UL (ref 4.1–11.1)

## 2018-11-15 PROCEDURE — 77030032490 HC SLV COMPR SCD KNE COVD -B

## 2018-11-15 PROCEDURE — 84100 ASSAY OF PHOSPHORUS: CPT

## 2018-11-15 PROCEDURE — 65610000006 HC RM INTENSIVE CARE

## 2018-11-15 PROCEDURE — 97116 GAIT TRAINING THERAPY: CPT

## 2018-11-15 PROCEDURE — 97535 SELF CARE MNGMENT TRAINING: CPT

## 2018-11-15 PROCEDURE — 74011000250 HC RX REV CODE- 250: Performed by: INTERNAL MEDICINE

## 2018-11-15 PROCEDURE — 74011250636 HC RX REV CODE- 250/636: Performed by: FAMILY MEDICINE

## 2018-11-15 PROCEDURE — 74011000258 HC RX REV CODE- 258: Performed by: FAMILY MEDICINE

## 2018-11-15 PROCEDURE — 83735 ASSAY OF MAGNESIUM: CPT

## 2018-11-15 PROCEDURE — 85025 COMPLETE CBC W/AUTO DIFF WBC: CPT

## 2018-11-15 PROCEDURE — 80053 COMPREHEN METABOLIC PANEL: CPT

## 2018-11-15 PROCEDURE — 74011000250 HC RX REV CODE- 250: Performed by: FAMILY MEDICINE

## 2018-11-15 PROCEDURE — 36415 COLL VENOUS BLD VENIPUNCTURE: CPT

## 2018-11-15 PROCEDURE — 74011250637 HC RX REV CODE- 250/637: Performed by: NURSE PRACTITIONER

## 2018-11-15 PROCEDURE — 74011250637 HC RX REV CODE- 250/637: Performed by: INTERNAL MEDICINE

## 2018-11-15 RX ORDER — FAMOTIDINE 20 MG/1
20 TABLET, FILM COATED ORAL 2 TIMES DAILY
Status: DISCONTINUED | OUTPATIENT
Start: 2018-11-15 | End: 2018-11-19 | Stop reason: HOSPADM

## 2018-11-15 RX ORDER — THERA TABS 400 MCG
1 TAB ORAL DAILY
Status: DISCONTINUED | OUTPATIENT
Start: 2018-11-16 | End: 2018-11-19 | Stop reason: HOSPADM

## 2018-11-15 RX ORDER — LANOLIN ALCOHOL/MO/W.PET/CERES
100 CREAM (GRAM) TOPICAL DAILY
Status: DISCONTINUED | OUTPATIENT
Start: 2018-11-16 | End: 2018-11-19 | Stop reason: HOSPADM

## 2018-11-15 RX ORDER — LEVETIRACETAM 500 MG/1
500 TABLET ORAL 2 TIMES DAILY
Status: DISCONTINUED | OUTPATIENT
Start: 2018-11-15 | End: 2018-11-19 | Stop reason: HOSPADM

## 2018-11-15 RX ORDER — FOLIC ACID 1 MG/1
1 TABLET ORAL DAILY
Status: DISCONTINUED | OUTPATIENT
Start: 2018-11-16 | End: 2018-11-19 | Stop reason: HOSPADM

## 2018-11-15 RX ADMIN — LEVETIRACETAM 500 MG: 500 TABLET ORAL at 18:32

## 2018-11-15 RX ADMIN — FAMOTIDINE 20 MG: 10 INJECTION, SOLUTION INTRAVENOUS at 08:55

## 2018-11-15 RX ADMIN — Medication 5 ML: at 06:22

## 2018-11-15 RX ADMIN — LABETALOL HYDROCHLORIDE 20 MG: 5 INJECTION, SOLUTION INTRAVENOUS at 06:22

## 2018-11-15 RX ADMIN — SODIUM CHLORIDE 75 ML/HR: 900 INJECTION, SOLUTION INTRAVENOUS at 05:09

## 2018-11-15 RX ADMIN — AMLODIPINE BESYLATE 10 MG: 5 TABLET ORAL at 08:55

## 2018-11-15 RX ADMIN — FAMOTIDINE 20 MG: 20 TABLET ORAL at 18:32

## 2018-11-15 RX ADMIN — FOLIC ACID: 5 INJECTION, SOLUTION INTRAMUSCULAR; INTRAVENOUS; SUBCUTANEOUS at 08:55

## 2018-11-15 RX ADMIN — Medication 5 ML: at 21:41

## 2018-11-15 RX ADMIN — SODIUM CHLORIDE 500 MG: 900 INJECTION, SOLUTION INTRAVENOUS at 06:22

## 2018-11-15 NOTE — PROGRESS NOTES
Problem: Self Care Deficits Care Plan (Adult) Goal: *Acute Goals and Plan of Care (Insert Text) Occupational Therapy Goals Initiated 11/13/2018 1. Patient will perform self-feeding using LUE and RUE as gross motor stabilizer with moderate assistance within 7 day(s). 2.  Patient will perform upper body ADLs using LUE and RUE as gross motor stabilizer with moderate assistance within 7 day(s). 3.  Patient will perform lower body ADLs using LUE and RUE as gross motor stabilizer with moderate assistance within 7 day(s). 4.  Patient will perform toilet transfers to [facility] with moderate assistance within 7 day(s). 5.  Patient will perform all aspects of toileting with moderate assistancewithin 7 day(s). 6.  Patient will participate in upper extremity therapeutic exercise/activities with moderate assistance  for 5 minutes within 7 day(s). 7.  Patient will utilize energy conservation techniques during functional activities with verbal and tactile cues within 7 day(s). 8.  Patient will improve their Fugl Almaguer score by 5 points in prep for ADLs within 7 days. Occupational Therapy TREATMENT Patient: Giorgio Garcia (11 y.o. male) Date: 11/15/2018 Diagnosis: ICH (intracerebral hemorrhage) ([facility]) ICH (intracerebral hemorrhage) ([facility] [address] Precautions: Fall, Bed Alarm Chart, occupational therapy assessment, plan of care, and goals were reviewed. ASSESSMENT: 
Patient cleared by RN to be seen for OT, received sitting EOB having just worked with PT. Patient with min Ax2 for sit<>stand and to ambulate to sink to complete oral care and grooming tasks. Patient able to maintain upright standing with min Ax1 and UE support on counter. Patient able to use RUE for GM stabilizer to open toothpaste and LUE to complete FM tasks. Noted patient kept R hand in sink and needed verbal and tactile cues to move R hand out of sink/water.  Patient able to return to chair with min Ax2 in order to shift weight and offload RLE. Patient issued pink resistive sponge and squeezing exercises with R hand (written on white board for retention). Patient educated on need to use R hand in order to allow for neuro re-education with fair/poor carryover. Patient left sitting up in chair with call bell in reach, all needs met and RN aware. Recommend with nursing patient to complete as able in order to maintain strength, endurance and independence: ADLs with supervision/setup, OOB to chair 3x/day and mobilizing to the [facility] for toileting with 2 assist (with gait belt). Thank you for your assistance. Progression toward goals: 
[]       Improving appropriately and progressing toward goals [x]       Improving slowly and progressing toward goals 
[]       Not making progress toward goals and plan of care will be adjusted PLAN: 
Patient continues to benefit from skilled intervention to address the above impairments. Continue treatment per established plan of care. Discharge Recommendations:  Inpatient Rehab vs. HHOT with 24/7 assist/supervision (if patient unable to go to IP rehab) Further Equipment Recommendations for Discharge:  TBD - will continue to assess SUBJECTIVE:  
Patient stated I write with my right.  OBJECTIVE DATA SUMMARY:  
Cognitive/Behavioral Status: 
Neurologic State: Alert Orientation Level: Oriented to person;Oriented to situation;Disoriented to situation;Disoriented to time Cognition: Decreased attention/concentration; Follows commands; Impaired decision making;Poor safety awareness Perception: Cues to attend right visual field;Cues to attend to right side of body;Cues to maintain midline in standing; Tactile;Verbal 
Perseveration: No perseveration noted Safety/Judgement: Decreased awareness of environment;Decreased awareness of need for assistance;Decreased awareness of need for safety;Decreased insight into deficits; Fall prevention Functional Mobility and Transfers for ADLs:Bed Mobility: 
Supine to Sit: Minimum assistance; Additional time Scooting: Minimum assistance;Contact guard assistance Transfers: 
Sit to Stand: Minimum assistance; Additional time;Assist x2 Balance: 
Sitting: Impaired Sitting - Static: Good (unsupported) Sitting - Dynamic: Good (unsupported) Standing: Impaired; With support Standing - Static: Fair;Constant support Standing - Dynamic : Fair ADL Intervention: 
Grooming Washing Face: Contact guard assistance(standing at sink ) Brushing Teeth: Minimum assistance;Training to use affected extremity as a gross motor assistance Cues: Physical assistance; Tactile cues provided;Verbal cues provided Cognitive Retraining Safety/Judgement: Decreased awareness of environment;Decreased awareness of need for assistance;Decreased awareness of need for safety;Decreased insight into deficits; Fall prevention Therapeutic Exercises:  
 - patient issued pink sponge and demonstrated ability to squeeze and open with R hand 1x10, patient instructed to complete 4x a day for 10x. Written on board to allow for better retention and follow through. Pain: 
Pain Scale 1: Numeric (0 - 10) Pain Intensity 1: 0 Activity Tolerance:  
Fair, VSS Please refer to the flowsheet for vital signs taken during this treatment. After treatment:  
[x] Patient left in no apparent distress sitting up in chair 
[] Patient left in no apparent distress in bed 
[x] Call bell left within reach [x] Nursing notified 
[] Caregiver present [x] Chair alarm activated COMMUNICATION/COLLABORATION:  
The patients plan of care was discussed with: Physical Therapist and Registered Nurse Johnna Taylor OT Time Calculation: 21 mins

## 2018-11-15 NOTE — PROGRESS NOTES
Problem: Mobility Impaired (Adult and Pediatric) Goal: *Acute Goals and Plan of Care (Insert Text) Physical Therapy Goals Initiated 11/13/2018 1. Patient will move from supine to sit and sit to supine , scoot up and down and roll side to side in bed with supervision/set-up within 7 day(s). 2.  Patient will transfer from bed to chair and chair to bed with minimal assistance/contact guard assist using the least restrictive device within 7 day(s). 3.  Patient will perform sit to stand with minimal assistance/contact guard assist within 7 day(s). 4.  Patient will ambulate with minimal assistance/contact guard assist for 50 feet with the least restrictive device within 7 day(s). 6.  Patient will complete Alcazar Balance Test within 7 days. physical Therapy TREATMENT Patient: Abhinav Monk (45 y.o. male) Date: 11/15/2018 Diagnosis: ICH (intracerebral hemorrhage) ([facility]) ICH (intracerebral hemorrhage) ([facility] [address] Precautions: Fall, Bed Alarm Chart, physical therapy assessment, plan of care and goals were reviewed. ASSESSMENT: 
Patient cleared by nursing for PT and in bed on arrival. Noted increased movement and muscle contraction right side but continued to require frequent cues to attend and attempt to use right side. Improved standing tolerance, initial blocking of right knee but progressed to only verbal cues required for quad contraction. Tolerated increasing distance of ambulation. Facilitation of right hip for RLE advancement, cues for increased step length and swing through. Right lean and trunk rotation noted, responded well to verbal and tactile cues. Showing good progress and an excellent rehab candidate. Recommend inpatient rehab. Progression toward goals: 
[]    Improving appropriately and progressing toward goals [x]    Improving slowly and progressing toward goals 
[]    Not making progress toward goals and plan of care will be adjusted PLAN: 
 Patient continues to benefit from skilled intervention to address the above impairments. Continue treatment per established plan of care. Discharge Recommendations:  Inpatient Rehab Further Equipment Recommendations for Discharge:  Defer to rehab SUBJECTIVE:  
Patient stated I'm good.  OBJECTIVE DATA SUMMARY:  
Critical Behavior: 
Neurologic State: Alert, Eyes open spontaneously Orientation Level: Oriented to person, Oriented to situation, Disoriented to place, Disoriented to time Cognition: Decreased attention/concentration, Decreased command following, Follows commands Safety/Judgement: Decreased awareness of environment, Decreased awareness of need for assistance, Decreased insight into deficits, Decreased awareness of need for safety, Fall prevention Functional Mobility Training: 
Bed Mobility: 
  
Supine to Sit: Minimum assistance; Additional time Scooting: Minimum assistance;Contact guard assistance Transfers: 
Sit to Stand: Minimum assistance;Assist x2; Additional time Stand to Sit: Minimum assistance;Assist x2 Balance: 
Sitting: Impaired Sitting - Static: Fair (occasional) Sitting - Dynamic: Fair (occasional) Standing: Impaired Standing - Static: Fair Standing - Dynamic : PoorAmbulation/Gait Training: 
Distance (ft): 25 Feet (ft) Assistive Device: Gait belt(bilateral HHA) Ambulation - Level of Assistance: Moderate assistance;Assist x2; Additional time Gait Abnormalities: Decreased step clearance; Hemiplegic; Foot drop Base of Support: Narrowed; Center of gravity altered;Shift to right Speed/Em: Slow Step Length: Right shortened;Left shortened(Right shorter) Swing Pattern: Right asymmetrical 
  
  
  
  
 Slow, improvements in RLE movement with cues/faciliation, right lean increased with fatigue Pain: 
Pain Scale 1: Numeric (0 - 10) Pain Intensity 1: 0 Activity Tolerance: VSS After treatment: []    Patient left in no apparent distress sitting up in chair 
[x]    Patient left in no apparent distress in bed; sitting EOB with OT arrival  
[x]    Call bell left within reach [x]    Nursing notified 
[]    Caregiver present 
[]    Bed alarm activated COMMUNICATION/COLLABORATION:  
The patients plan of care was discussed with: Registered Nurse Zohaib Villarreal, PT, DPT Time Calculation: 15 mins

## 2018-11-15 NOTE — PROGRESS NOTES
Hospitalist Progress Note Ivonne Adams MD 
Answering service: [phone] OR [phone] from in house phone Date of Service:  11/15/2018 NAME:  Kelly Giron 
:  1980 MRN:  [id number] Admission Summary:  
 
Patient is a 41year old  man who is heavy alcoholic brought to the ER for confusion. He had fallen the night prior. He was admitted to the ICU for ICH and alcohol withdrawal.  
 
 
Interval history / Subjective:  
  
Off precedex off now Off Cardene to control BP wean of as tolerates to oral meds Not a surgical candidate for ICH with CLD with low plt For angiogram of brain by [facility] tomorrow Assessment & Plan:  
 
Acute metabolic encephalopathy due to acute left parietal intraparenchymal hemorrhage 
- non traumatic as suspected by NS and also etoh withdrawal 
- Seen by NSGY and requested eval from [facility] 
- for Angiogram tomorrow AM 
  
Possibly non traumatic ICH associated with brain compression and vasogenic edema  
-Neurosurgery following: no surgical intervention or steroids indicated 
-repeat CT stable 
-CTA head - 1. No interval change in size and appearance of large left posterior parietal 
parenchymal hematoma. Small approximate 1 cm tuft of vessels along the posterior superior margin of the hematoma. Possible vascular malformation No other vascular abnormalities demonstrated. 4. No evidence of spots sign to suggest ongoing or progressive hemorrhage. 
-Continue keppra, neuro check and ICU monitoring. 
  
UTI POA : Completed course 
  
Alcohol withdrawal 
-On CIWA protocol. Goodie bag. 
  
Thrombocytopenia 
-Due likely to ETOH. Monitor  
-S/p 3 units of platelets. Plt > 100 
  
Hyponatremia due to alcohol: RESOLVED 
  
Code status: Full DVT prophylaxis: SCD Care Plan discussed with: Patient/Family and Nurse Disposition: TBD Hospital Problems  Date Reviewed: 2018 Codes Class Noted POA * (Principal) ICH (intracerebral hemorrhage) ([facility] [address] ICD-10-CM: I61.9 ICD-9-CM: 219  11/9/2018 Unknown Review of Systems:  
Review of systems not obtained due to patient factors. Vital Signs:  
 Last 24hrs VS reviewed since prior progress note. Most recent are: 
Visit Vitals /76 Pulse 68 Temp 98.2 °F (36.8 °C) Resp 20 Ht 5' 8\" (1.727 m) Wt 84.3 kg (185 lb 13.6 oz) SpO2 94% BMI 28.26 kg/m² Intake/Output Summary (Last 24 hours) at 11/15/2018 1208 Last data filed at 11/15/2018 0900 Gross per 24 hour Intake 2275 ml Output 2500 ml Net -225 ml Physical Examination:  
 
 
     
Constitutional:  No acute distress, ENT:  Oral mucous moist  
Resp:  CTA bilaterally. CV:  Regular rhythm, normal rate, GI:  Soft, non distended, non tender. bs= Musculoskeletal:  No edema, warm, 2+ pulses throughout Neurologic:  Moves all extremities. AAOx 3 Data Review:  
 I personally reviewed  Image and labs Labs:  
 
Recent Labs 11/15/18 
0434 11/13/18 
0326 WBC 10.7 8.2 HGB 13.3 12.9 HCT 39.3 39.6 * 61* Recent Labs 11/15/18 
0434 11/13/18 
0326  137  
K 3.6 3.8  109* CO2 23 18* BUN 6 10 CREA 0.52* 0.51* GLU 99 90  
CA 8.4* 7.9*  
MG 1.9 1.8 PHOS 3.7 3.4 Recent Labs 11/15/18 
0434 11/13/18 
0326 SGOT 51* 37 ALT 37 23 * 99 TBILI 2.0* 1.9* TP 8.7* 8.1 ALB 3.0* 2.6*  
GLOB 5.7* 5.5* No results for input(s): INR, PTP, APTT in the last 72 hours. No lab exists for component: INREXT, INREXT No results for input(s): FE, TIBC, PSAT, FERR in the last 72 hours. No results found for: FOL, RBCF No results for input(s): PH, PCO2, PO2 in the last 72 hours. No results for input(s): CPK, CKNDX, TROIQ in the last 72 hours. No lab exists for component: CPKMB Lab Results Component Value Date/Time  Cholesterol, total 82 11/09/2018 05:23 PM  
 HDL Cholesterol 31 11/09/2018 05:23 PM  
 LDL, calculated 39.6 11/09/2018 05:23 PM  
 Triglyceride 57 11/09/2018 05:23 PM  
 CHOL/HDL Ratio 2.6 11/09/2018 05:23 PM  
 
No results found for: Chalo Miguel Lab Results Component Value Date/Time Color ORANGE 11/09/2018 01:07 PM  
 Appearance CLOUDY (A) 11/09/2018 01:07 PM  
 Specific gravity 1.025 11/09/2018 01:07 PM  
 pH (UA) 6.5 11/09/2018 01:07 PM  
 Protein 300 (A) 11/09/2018 01:07 PM  
 Glucose NEGATIVE  11/09/2018 01:07 PM  
 Ketone 15 (A) 11/09/2018 01:07 PM  
 Urobilinogen >8.0 (H) 11/09/2018 01:07 PM  
 Nitrites POSITIVE (A) 11/09/2018 01:07 PM  
 Leukocyte Esterase SMALL (A) 11/09/2018 01:07 PM  
 Epithelial cells FEW 11/09/2018 01:07 PM  
 Bacteria NEGATIVE  11/09/2018 01:07 PM  
 WBC 5-10 11/09/2018 01:07 PM  
 RBC 20-50 11/09/2018 01:07 PM  
 
 
 
Medications Reviewed:  
 
Current Facility-Administered Medications Medication Dose Route Frequency  [START ON 11/16/2018] thiamine HCL (B-1) tablet 100 mg  100 mg Oral DAILY  [START ON 51/67/8934] folic acid (FOLVITE) tablet 1 mg  1 mg Oral DAILY  [START ON 11/16/2018] therapeutic multivitamin (THERAGRAN) tablet 1 Tab  1 Tab Oral DAILY  famotidine (PEPCID) tablet 20 mg  20 mg Oral BID  levETIRAcetam (KEPPRA) tablet 500 mg  500 mg Oral BID  niCARdipine (CARDENE) 25 mg in 0.9% sodium chloride 250 mL infusion  0-15 mg/hr IntraVENous TITRATE  hydrALAZINE (APRESOLINE) 20 mg/mL injection 20 mg  20 mg IntraVENous Q6H PRN  
 labetalol (NORMODYNE;TRANDATE) 5 mg/mL injection 20 mg  20 mg IntraVENous Q6H PRN  
 amLODIPine (NORVASC) tablet 10 mg  10 mg Oral DAILY  acetaminophen (TYLENOL) tablet 650 mg  650 mg Oral Q4H PRN Or  
 acetaminophen (TYLENOL) solution 650 mg  650 mg Per NG tube Q4H PRN  Or  
 acetaminophen (TYLENOL) suppository 650 mg  650 mg Rectal Q4H PRN  
 influenza vaccine 2018-19 (6 mos+)(PF) (FLUARIX QUAD/FLULAVAL QUAD) injection 0.5 mL  0.5 mL IntraMUSCular PRIOR TO DISCHARGE  LORazepam (ATIVAN) injection 1 mg  1 mg IntraVENous Q1H PRN  
 LORazepam (ATIVAN) injection 2 mg  2 mg IntraVENous Q1H PRN  
 ondansetron (ZOFRAN) injection 4 mg  4 mg IntraVENous Q6H PRN  
 naloxone (NARCAN) injection 0.4 mg  0.4 mg IntraVENous PRN  
 0.9% sodium chloride infusion 250 mL  250 mL IntraVENous PRN  
 SALINE PERIPHERAL FLUSH Q8H soln 5 mL  5 mL InterCATHeter Q8H  
 
______________________________________________________________________ EXPECTED LENGTH OF STAY: 3d 4h 
ACTUAL LENGTH OF STAY:          6 Wili Maciel MD

## 2018-11-15 NOTE — PROGRESS NOTES
1930 Bedside and Verbal shift change report given to ELENA Callahan RN (oncoming nurse) by Newton Thapa RN (offgoing nurse). Report included the following information SBAR, Kardex, ED Summary, OR Summary, Procedure Summary, Intake/Output, MAR, Accordion and Recent Results. Shift Summary: 
Patient vital signs stable, NSR, 95% on room air. Q2 neuro checks with no changes: Patient A+O x4, pupils equal and reactive, patient moves all four extremities with weakness in the R upper extremity.

## 2018-11-15 NOTE — PROGRESS NOTES
0730: Bedside and Verbal shift change report given to Curtis Covarrubias RN (oncoming nurse) by Tere Ortiz RN (offgoing nurse). Report included the following information SBAR, Kardex, Intake/Output, MAR, Recent Results and Cardiac Rhythm NSR.  
 
0800:  Assessment complete. No new issues. Patient resting comfortably in bed, oriented to self and situation, confused about place and time, delayed responses, DIEHL's, follows commands. VSS. 
 
1200:  Reassessment complete. No new issues. Patient incontinent of urine. Patient bathed and repositioned in the bed. Urinal at hand. 1600:  Reassessment complete. No new issues. Patient up in chair. Patient needed more prompting for right hand movement at this assessment. 1700:  Patient incontinent of large amount of urine again. Patient bathed and placed back in bed from chair. Condom cath placed. 1930:  Bedside and Verbal shift change report given to Rafael Porras RN (oncoming nurse) by Curtis Covarrubias RN (offgoing nurse). Report included the following information SBAR, Kardex, Intake/Output, MAR, Recent Results and Cardiac Rhythm NSR.

## 2018-11-15 NOTE — PROGRESS NOTES
PCCM 
 
Off of cardene and precedex Plt up to 101 Patient Vitals for the past 4 hrs: 
 BP Pulse Resp SpO2  
11/15/18 0900  66 21 91 % 11/15/18 0800 132/74 65 25 92 % 11/15/18 0715 134/87 82 (!) 31 90 % 11/15/18 0700 120/68 69 23 90 % 11/15/18 0645 111/72 71 17 (!) 88 % 11/15/18 0630 113/66 70 19 95 % 11/15/18 0615 (!) 142/94 98 19 91 % 11/15/18 0600 (!) 132/96 91 27 93 % 11/15/18 0545 124/76 70 17 91 % 11/15/18 0530 (!) 143/102 99 23 94 % Temp (24hrs), Av.5 °F (36.9 °C), Min:97.6 °F (36.4 °C), Max:99 °F (37.2 °C) Intake/Output Summary (Last 24 hours) at 11/15/2018 0057 Last data filed at 11/15/2018 0900 Gross per 24 hour Intake 2620 ml Output 2500 ml Net 120 ml Lab: 
Recent Labs 11/15/18 
0434 18 
0326 WBC 10.7 8.2 HGB 13.3 12.9 * 61*  137  
K 3.6 3.8  109* CO2 23 18* BUN 6 10 CREA 0.52* 0.51* GLU 99 90  
CA 8.4* 7.9*  
MG 1.9 1.8 PHOS 3.7 3.4 TBILI 2.0* 1.9*  
SGOT 51* 37 Intracranial hemorrhage - ? Small AVM per NS - [facility] following 
  
Htn 
  
EtOH abuse with withdrawal - improved 
  Thrombocytopenia 2/2 to EtOH abuse 
  Low grade fever - started on rocephin 
 
--timing of angio per [facility] 
--med management Ok to move out of ICU from a critical care standpoint Harsh Arriaza MD

## 2018-11-16 ENCOUNTER — APPOINTMENT (OUTPATIENT)
Dept: INTERVENTIONAL RADIOLOGY/VASCULAR | Age: 38
DRG: 064 | End: 2018-11-16
Attending: RADIOLOGY
Payer: SELF-PAY

## 2018-11-16 PROCEDURE — B31F1ZZ FLUOROSCOPY OF LEFT VERTEBRAL ARTERY USING LOW OSMOLAR CONTRAST: ICD-10-PCS | Performed by: RADIOLOGY

## 2018-11-16 PROCEDURE — 77030008638 HC TU CONN COOK -A

## 2018-11-16 PROCEDURE — C1769 GUIDE WIRE: HCPCS

## 2018-11-16 PROCEDURE — 65610000006 HC RM INTENSIVE CARE

## 2018-11-16 PROCEDURE — 77030021532 HC CATH ANGI DX IMPRS MRTM -B

## 2018-11-16 PROCEDURE — B3181ZZ FLUOROSCOPY OF BILATERAL INTERNAL CAROTID ARTERIES USING LOW OSMOLAR CONTRAST: ICD-10-PCS | Performed by: RADIOLOGY

## 2018-11-16 PROCEDURE — 77030008584 HC TOOL GDWRE DEV TERU -A

## 2018-11-16 PROCEDURE — 74011250637 HC RX REV CODE- 250/637: Performed by: INTERNAL MEDICINE

## 2018-11-16 PROCEDURE — C1760 CLOSURE DEV, VASC: HCPCS

## 2018-11-16 PROCEDURE — 74011250636 HC RX REV CODE- 250/636: Performed by: RADIOLOGY

## 2018-11-16 PROCEDURE — 74011250637 HC RX REV CODE- 250/637: Performed by: NURSE PRACTITIONER

## 2018-11-16 PROCEDURE — 99153 MOD SED SAME PHYS/QHP EA: CPT

## 2018-11-16 PROCEDURE — 74011636320 HC RX REV CODE- 636/320: Performed by: RADIOLOGY

## 2018-11-16 PROCEDURE — B3151ZZ FLUOROSCOPY OF BILATERAL COMMON CAROTID ARTERIES USING LOW OSMOLAR CONTRAST: ICD-10-PCS | Performed by: RADIOLOGY

## 2018-11-16 PROCEDURE — 74011250636 HC RX REV CODE- 250/636

## 2018-11-16 PROCEDURE — C1894 INTRO/SHEATH, NON-LASER: HCPCS

## 2018-11-16 RX ORDER — MIDAZOLAM HYDROCHLORIDE 1 MG/ML
5 INJECTION, SOLUTION INTRAMUSCULAR; INTRAVENOUS
Status: DISCONTINUED | OUTPATIENT
Start: 2018-11-16 | End: 2018-11-16 | Stop reason: HOSPADM

## 2018-11-16 RX ORDER — FENTANYL CITRATE 50 UG/ML
100 INJECTION, SOLUTION INTRAMUSCULAR; INTRAVENOUS
Status: DISCONTINUED | OUTPATIENT
Start: 2018-11-16 | End: 2018-11-16 | Stop reason: HOSPADM

## 2018-11-16 RX ORDER — BACITRACIN 500 UNIT/G
PACKET (EA) TOPICAL
Status: DISPENSED
Start: 2018-11-16 | End: 2018-11-17

## 2018-11-16 RX ORDER — FENTANYL CITRATE 50 UG/ML
INJECTION, SOLUTION INTRAMUSCULAR; INTRAVENOUS
Status: DISPENSED
Start: 2018-11-16 | End: 2018-11-17

## 2018-11-16 RX ORDER — MIDAZOLAM HYDROCHLORIDE 1 MG/ML
INJECTION, SOLUTION INTRAMUSCULAR; INTRAVENOUS
Status: DISPENSED
Start: 2018-11-16 | End: 2018-11-17

## 2018-11-16 RX ORDER — SODIUM CHLORIDE 9 MG/ML
25 INJECTION, SOLUTION INTRAVENOUS CONTINUOUS
Status: DISCONTINUED | OUTPATIENT
Start: 2018-11-16 | End: 2018-11-16 | Stop reason: HOSPADM

## 2018-11-16 RX ORDER — LIDOCAINE HYDROCHLORIDE 20 MG/ML
20 INJECTION, SOLUTION INFILTRATION; PERINEURAL ONCE
Status: COMPLETED | OUTPATIENT
Start: 2018-11-16 | End: 2018-11-16

## 2018-11-16 RX ADMIN — MIDAZOLAM HYDROCHLORIDE 0.5 MG: 1 INJECTION, SOLUTION INTRAMUSCULAR; INTRAVENOUS at 13:50

## 2018-11-16 RX ADMIN — FENTANYL CITRATE 25 MCG: 50 INJECTION, SOLUTION INTRAMUSCULAR; INTRAVENOUS at 13:12

## 2018-11-16 RX ADMIN — THERA TABS 1 TABLET: TAB at 09:21

## 2018-11-16 RX ADMIN — IOPAMIDOL 80 ML: 612 INJECTION, SOLUTION INTRAVENOUS at 13:59

## 2018-11-16 RX ADMIN — AMLODIPINE BESYLATE 10 MG: 5 TABLET ORAL at 09:21

## 2018-11-16 RX ADMIN — HEPARIN SODIUM 4000 UNITS: 5000 INJECTION, SOLUTION INTRAVENOUS; SUBCUTANEOUS at 14:07

## 2018-11-16 RX ADMIN — LEVETIRACETAM 500 MG: 500 TABLET ORAL at 18:21

## 2018-11-16 RX ADMIN — MIDAZOLAM HYDROCHLORIDE 1 MG: 1 INJECTION, SOLUTION INTRAMUSCULAR; INTRAVENOUS at 13:05

## 2018-11-16 RX ADMIN — LEVETIRACETAM 500 MG: 500 TABLET ORAL at 09:20

## 2018-11-16 RX ADMIN — FENTANYL CITRATE 25 MCG: 50 INJECTION, SOLUTION INTRAMUSCULAR; INTRAVENOUS at 13:49

## 2018-11-16 RX ADMIN — LIDOCAINE HYDROCHLORIDE 10 ML: 20 INJECTION, SOLUTION INFILTRATION; PERINEURAL at 13:14

## 2018-11-16 RX ADMIN — Medication 100 MG: at 09:21

## 2018-11-16 RX ADMIN — HEPARIN SODIUM 4000 UNITS: 5000 INJECTION, SOLUTION INTRAVENOUS; SUBCUTANEOUS at 14:06

## 2018-11-16 RX ADMIN — FAMOTIDINE 20 MG: 20 TABLET ORAL at 09:21

## 2018-11-16 RX ADMIN — FAMOTIDINE 20 MG: 20 TABLET ORAL at 18:21

## 2018-11-16 RX ADMIN — HEPARIN SODIUM 4000 UNITS: 5000 INJECTION, SOLUTION INTRAVENOUS; SUBCUTANEOUS at 14:03

## 2018-11-16 RX ADMIN — Medication 5 ML: at 06:49

## 2018-11-16 RX ADMIN — FOLIC ACID 1 MG: 1 TABLET ORAL at 09:21

## 2018-11-16 RX ADMIN — HEPARIN SODIUM 4000 UNITS: 5000 INJECTION, SOLUTION INTRAVENOUS; SUBCUTANEOUS at 14:05

## 2018-11-16 NOTE — PROCEDURES
NEUROINTERVENTIONAL SURGERY POST-PROCEDURE NOTE    PROCEDURE:  Cerebral angiogram     VESSEL(S) STUDIED:  1. Right ICA  2. Left ICA  3. Left vertebral artery VESSEL(S) TREATED:  1. None. PRELIMINARY REPORT & DISPOSITION:   Small left parietal arteriovenous malformation with 8.6 x 3.9 mm nidus supplied by a posterior parietal branch of the left MCA and draining to a single superficial parietal cortical vein. COMPLICATIONS:  None. FOLLOW-UP:  - Return to ICU  - Neurosurgery follow up [address] [place] [address] Street:  11/16/2018 2:13 PM     ATTENDING SURGEON(S):  Susana Bolden MD      ANESTHESIA:   Conscious sedation    MEDICATIONS:   See nursing record    PUNCTURE SITE:  Right common femoral artery. Arteriotomy closed with MynxGrip. Flat with leg straight x 5 hours. Lynda Zaragoza M.D.     Corinne Hwang    , Department of Radiology  [employer] [facility]

## 2018-11-16 NOTE — PROGRESS NOTES
1240: Patient brought down to IR for angiogram. Consent in patient's chart. 1447: Patient back to ICU. Vital signs taken and puncture site on right groin assessed. Pulses and neuro status assessed. Continue to monitor per orders.

## 2018-11-16 NOTE — PROGRESS NOTES
Hospitalist Progress Note Ann Marie Weston MD 
Answering service: [phone] OR [phone] from in house phone Date of Service:  2018 NAME:  Demetrius Giron 
:  1980 MRN:  [id number] Admission Summary:  
 
Patient is a 41year old  man who is heavy alcoholic brought to the ER for confusion. He had fallen the night prior. He was admitted to the ICU for ICH and alcohol withdrawal.  
 
 
Interval history / Subjective:  
  
Off precedex off now Off Cardene to control BP wean of as tolerates to oral meds Not a surgical candidate for ICH with CLD with low plt For angiogram of brain by NIS Assessment & Plan:  
 
Acute metabolic encephalopathy due to acute left parietal intraparenchymal hemorrhage 
- non traumatic as suspected by NS and also etoh withdrawal 
- Seen by NSGY and requested eval from NIS 
- for Angiogram today per NIS  
  
Possibly non traumatic ICH associated with brain compression and vasogenic edema  
-Neurosurgery following: no surgical intervention or steroids indicated 
-repeat CT stable 
-CTA head - 1. No interval change in size and appearance of large left posterior parietal 
parenchymal hematoma. Small approximate 1 cm tuft of vessels along the posterior superior margin of the hematoma. Possible vascular malformation No other vascular abnormalities demonstrated. 4. No evidence of spots sign to suggest ongoing or progressive hemorrhage. 
-Continue keppra, neuro check and ICU monitoring. 
  
UTI POA : Completed course 
  
Alcohol withdrawal 
-On CIWA protocol. Goodie bag. 
  
Thrombocytopenia 
-Due likely to ETOH. Monitor  
-S/p 3 units of platelets. Plt > 100 
  
Hyponatremia due to alcohol: Resolved 
  
Code status: Full DVT prophylaxis: SCD Care Plan discussed with: Patient/Family and Nurse Disposition: TBD Hospital Problems  Date Reviewed: 2018 Codes Class Noted POA * (Principal) ICH (intracerebral hemorrhage) ([facility] [address] ICD-10-CM: I61.9 ICD-9-CM: 457  11/9/2018 Unknown Review of Systems:  
Review of systems not obtained due to patient factors. Vital Signs:  
 Last 24hrs VS reviewed since prior progress note. Most recent are: 
Visit Vitals /75 Pulse 66 Temp 99.1 °F (37.3 °C) Resp 21 Ht 5' 8\" (1.727 m) Wt 59.3 kg (130 lb 11.7 oz) SpO2 91% BMI 19.88 kg/m² Intake/Output Summary (Last 24 hours) at 11/16/2018 1142 Last data filed at 11/16/2018 0800 Gross per 24 hour Intake  Output 4150 ml Net -4150 ml Physical Examination:  
 
 
     
Constitutional:  No acute distress, ENT:  Oral mucous moist  
Resp:  CTA bilaterally. CV:  Regular rhythm, normal rate, GI:  Soft, non distended, non tender. bs= Musculoskeletal:  No edema, warm, 2+ pulses throughout Neurologic:  Moves all extremities. AAOx 3 Data Review:  
 I personally reviewed  Image and labs Labs:  
 
Recent Labs 11/15/18 
0434 WBC 10.7 HGB 13.3 HCT 39.3 * Recent Labs 11/15/18 
0434   
K 3.6  CO2 23 BUN 6  
CREA 0.52* GLU 99  
CA 8.4* MG 1.9 PHOS 3.7 Recent Labs 11/15/18 
0434 SGOT 51* ALT 37  
* TBILI 2.0*  
TP 8.7* ALB 3.0*  
GLOB 5.7* No results for input(s): INR, PTP, APTT in the last 72 hours. No lab exists for component: INREXT, INREXT No results for input(s): FE, TIBC, PSAT, FERR in the last 72 hours. No results found for: FOL, RBCF No results for input(s): PH, PCO2, PO2 in the last 72 hours. No results for input(s): CPK, CKNDX, TROIQ in the last 72 hours. No lab exists for component: CPKMB Lab Results Component Value Date/Time  Cholesterol, total 82 11/09/2018 05:23 PM  
 HDL Cholesterol 31 11/09/2018 05:23 PM  
 LDL, calculated 39.6 11/09/2018 05:23 PM  
 Triglyceride 57 11/09/2018 05:23 PM  
 CHOL/HDL Ratio 2.6 11/09/2018 05:23 PM  
 
 No results found for: Bear Sellers Lab Results Component Value Date/Time Color ORANGE 11/09/2018 01:07 PM  
 Appearance CLOUDY (A) 11/09/2018 01:07 PM  
 Specific gravity 1.025 11/09/2018 01:07 PM  
 pH (UA) 6.5 11/09/2018 01:07 PM  
 Protein 300 (A) 11/09/2018 01:07 PM  
 Glucose NEGATIVE  11/09/2018 01:07 PM  
 Ketone 15 (A) 11/09/2018 01:07 PM  
 Urobilinogen >8.0 (H) 11/09/2018 01:07 PM  
 Nitrites POSITIVE (A) 11/09/2018 01:07 PM  
 Leukocyte Esterase SMALL (A) 11/09/2018 01:07 PM  
 Epithelial cells FEW 11/09/2018 01:07 PM  
 Bacteria NEGATIVE  11/09/2018 01:07 PM  
 WBC 5-10 11/09/2018 01:07 PM  
 RBC 20-50 11/09/2018 01:07 PM  
 
 
 
Medications Reviewed:  
 
Current Facility-Administered Medications Medication Dose Route Frequency  thiamine HCL (B-1) tablet 100 mg  100 mg Oral DAILY  folic acid (FOLVITE) tablet 1 mg  1 mg Oral DAILY  therapeutic multivitamin (THERAGRAN) tablet 1 Tab  1 Tab Oral DAILY  famotidine (PEPCID) tablet 20 mg  20 mg Oral BID  levETIRAcetam (KEPPRA) tablet 500 mg  500 mg Oral BID  niCARdipine (CARDENE) 25 mg in 0.9% sodium chloride 250 mL infusion  0-15 mg/hr IntraVENous TITRATE  hydrALAZINE (APRESOLINE) 20 mg/mL injection 20 mg  20 mg IntraVENous Q6H PRN  
 labetalol (NORMODYNE;TRANDATE) 5 mg/mL injection 20 mg  20 mg IntraVENous Q6H PRN  
 amLODIPine (NORVASC) tablet 10 mg  10 mg Oral DAILY  acetaminophen (TYLENOL) tablet 650 mg  650 mg Oral Q4H PRN Or  
 acetaminophen (TYLENOL) solution 650 mg  650 mg Per NG tube Q4H PRN Or  
 acetaminophen (TYLENOL) suppository 650 mg  650 mg Rectal Q4H PRN  
 influenza vaccine 2018-19 (6 mos+)(PF) (FLUARIX QUAD/FLULAVAL QUAD) injection 0.5 mL  0.5 mL IntraMUSCular PRIOR TO DISCHARGE  ondansetron (ZOFRAN) injection 4 mg  4 mg IntraVENous Q6H PRN  
 naloxone (NARCAN) injection 0.4 mg  0.4 mg IntraVENous PRN  
 0.9% sodium chloride infusion 250 mL  250 mL IntraVENous PRN  
  SALINE PERIPHERAL FLUSH Q8H soln 5 mL  5 mL InterCATHeter Q8H  
 
______________________________________________________________________ EXPECTED LENGTH OF STAY: 3d 4h 
ACTUAL LENGTH OF STAY:          7 Elvis Chun MD

## 2018-11-16 NOTE — PROGRESS NOTES
1930 Bedside and Verbal shift change report given to ELENA Chao RN (oncoming nurse) by Brooke Calvin RN (offgoing nurse). Report included the following information SBAR, Kardex, ED Summary, OR Summary, Procedure Summary, Intake/Output, MAR, Accordion and Recent Results.

## 2018-11-16 NOTE — ROUTINE PROCESS
TRANSFER - OUT REPORT: 
 
Verbal report given to Eleuterio Larsen, ICU RN on Edvin Neal  being transferred to ICU 18 for routine progression of care Report consisted of patients Situation, Background, Assessment and  
Recommendations(SBAR). Information from the following report(s) SBAR was reviewed with the receiving nurse. Lines:  
Peripheral IV 11/09/18 Anterior;Distal;Inferior; Lower;Right Arm (Active) Site Assessment Clean, dry, & intact 11/16/2018  8:00 AM  
Phlebitis Assessment 0 11/16/2018  8:00 AM  
Infiltration Assessment 0 11/16/2018  8:00 AM  
Dressing Status Clean, dry, & intact 11/16/2018  8:00 AM  
Dressing Type Transparent 11/16/2018  8:00 AM  
Hub Color/Line Status Green 11/16/2018  8:00 AM  
Action Taken Open ports on tubing capped 11/16/2018  8:00 AM  
Alcohol Cap Used Yes 11/16/2018  8:00 AM  
   
Peripheral IV 11/15/18 Left; Lower; Outer Forearm (Active) Site Assessment Clean, dry, & intact 11/16/2018  8:00 AM  
Phlebitis Assessment 0 11/16/2018  8:00 AM  
Infiltration Assessment 0 11/16/2018  8:00 AM  
Dressing Status Clean, dry, & intact 11/16/2018  8:00 AM  
Dressing Type Transparent 11/16/2018  8:00 AM  
Hub Color/Line Status Pink 11/16/2018  8:00 AM  
Action Taken Open ports on tubing capped 11/16/2018  8:00 AM  
Alcohol Cap Used Yes 11/16/2018  8:00 AM  
  
 
Opportunity for questions and clarification was provided. Patient transported with: 
 Eleuterio Larsen ICU RN and Shanta Archer ICU PCT in bed on monitor back to unit; right groin dsg dry and intact

## 2018-11-16 NOTE — PROGRESS NOTES
PCCM 
 
Off of cardene and precedex For cerebral angiogram today Patient Vitals for the past 4 hrs: 
 BP Temp Pulse Resp SpO2 Weight 18 1100 115/75  66 21 91 %   
18 1000 125/86  77 16 90 %   
18 0900 110/72  62 17 91 %   
18 0805      59.3 kg (130 lb 11.7 oz)  
18 0800 121/80 99.1 °F (37.3 °C) 64 18 92 %  Temp (24hrs), Av.2 °F (36.8 °C), Min:97 °F (36.1 °C), Max:99.2 °F (37.3 °C) Intake/Output Summary (Last 24 hours) at 2018 1140 Last data filed at 2018 0800 Gross per 24 hour Intake  Output 4150 ml Net -4150 ml Lab: 
Recent Labs 11/15/18 
0434 WBC 10.7 HGB 13.3 *   
K 3.6  CO2 23 BUN 6  
CREA 0.52* GLU 99  
CA 8.4* MG 1.9 PHOS 3.7 TBILI 2.0*  
SGOT 51* Intracranial hemorrhage - ? Small AVM per NS - NIS following 
  
Htn 
  
EtOH abuse with withdrawal - improved 
  Thrombocytopenia 2/2 to EtOH abuse 
  Low grade fever - started on rocephin 
 
--angiogram today 
--med management 
--off precedex / Desma Booty / and off ciwa ativan Ok to move out of ICU from a critical care standpoint based on angio results Will see again if needed Dipika Keita MD

## 2018-11-16 NOTE — PROGRESS NOTES
Problem: Falls - Risk of 
Goal: *Absence of Falls Document [place] Fall Risk and appropriate interventions in the flowsheet. Outcome: Progressing Towards Goal 
Fall Risk Interventions: 
Mobility Interventions: Patient to call before getting OOB, Communicate number of staff needed for ambulation/transfer Mentation Interventions: More frequent rounding, Reorient patient, Room close to nurse's station, Door open when patient unattended, Update white board Medication Interventions: Bed/chair exit alarm, Evaluate medications/consider consulting pharmacy Elimination Interventions: Call light in reach, Patient to call for help with toileting needs, Toilet paper/wipes in reach History of Falls Interventions: Door open when patient unattended, Room close to nurse's station Problem: Pressure Injury - Risk of 
Goal: *Prevention of pressure injury Document Jose Maria Scale and appropriate interventions in the flowsheet. Outcome: Progressing Towards Goal 
Pressure Injury Interventions: 
Sensory Interventions: Assess changes in LOC, Keep linens dry and wrinkle-free, Maintain/enhance activity level, Minimize linen layers, Monitor skin under medical devices Moisture Interventions: Absorbent underpads, Internal/External urinary devices, Minimize layers Activity Interventions: Assess need for specialty bed, Pressure redistribution bed/mattress(bed type) Mobility Interventions: HOB 30 degrees or less, Pressure redistribution bed/mattress (bed type) Nutrition Interventions: Document food/fluid/supplement intake Friction and Shear Interventions: HOB 30 degrees or less, Lift sheet, Minimize layers

## 2018-11-16 NOTE — PROGRESS NOTES
Speech Pathology:  Chart reviewed and discussed with RN. Nsg working with patient in preparation to go off floor for procedure. Language treatment deferred. SLP to continue to follow. Thank you. 1415: Attempted to see patient for language treatment however patient remain off floor for procedure. SLP will continue to follow next week. Thank you Alma De Luna, SLP

## 2018-11-16 NOTE — PROGRESS NOTES
Occupational Therapy [id number] -  
[phone] Chart reviewed in prep for OT treatment, patient currently EMILY for procedure. Will f/u on Monday for treatment. Recommend with nursing patient to complete as able in order to maintain strength, endurance and independence: ADLs with supervision/setup, OOB to chair 3x/day and mobilizing to the [facility] for toileting with 2 assist. Thank you for your assistance. Maria Esther Hogue MS, OTR/L

## 2018-11-17 PROCEDURE — 74011250637 HC RX REV CODE- 250/637: Performed by: NURSE PRACTITIONER

## 2018-11-17 PROCEDURE — 74011000250 HC RX REV CODE- 250: Performed by: INTERNAL MEDICINE

## 2018-11-17 PROCEDURE — 65660000000 HC RM CCU STEPDOWN

## 2018-11-17 PROCEDURE — 74011250637 HC RX REV CODE- 250/637: Performed by: INTERNAL MEDICINE

## 2018-11-17 PROCEDURE — 94760 N-INVAS EAR/PLS OXIMETRY 1: CPT

## 2018-11-17 RX ADMIN — LEVETIRACETAM 500 MG: 500 TABLET ORAL at 08:25

## 2018-11-17 RX ADMIN — AMLODIPINE BESYLATE 10 MG: 5 TABLET ORAL at 08:25

## 2018-11-17 RX ADMIN — Medication 5 ML: at 22:00

## 2018-11-17 RX ADMIN — LEVETIRACETAM 500 MG: 500 TABLET ORAL at 17:24

## 2018-11-17 RX ADMIN — Medication 5 ML: at 13:50

## 2018-11-17 RX ADMIN — Medication 5 ML: at 06:00

## 2018-11-17 RX ADMIN — THERA TABS 1 TABLET: TAB at 08:25

## 2018-11-17 RX ADMIN — Medication 100 MG: at 08:25

## 2018-11-17 RX ADMIN — Medication 5 ML: at 00:03

## 2018-11-17 RX ADMIN — FAMOTIDINE 20 MG: 20 TABLET ORAL at 08:25

## 2018-11-17 RX ADMIN — FOLIC ACID 1 MG: 1 TABLET ORAL at 08:25

## 2018-11-17 RX ADMIN — FAMOTIDINE 20 MG: 20 TABLET ORAL at 17:24

## 2018-11-17 RX ADMIN — LABETALOL HYDROCHLORIDE 20 MG: 5 INJECTION, SOLUTION INTRAVENOUS at 00:03

## 2018-11-17 NOTE — PROGRESS NOTES
Hospitalist Progress Note Ace Isabel MD 
Answering service: [phone] OR [phone] from in house phone Date of Service:  2018 NAME:  Teofilo Giron 
:  1980 MRN:  [id number] Admission Summary:  
 
Patient is a 41year old  man who is heavy alcoholic brought to the ER for confusion. He had fallen the night prior. He was admitted to the ICU for ICH and alcohol withdrawal.  
 
 
Interval history / Subjective:  
  
Off precedex off now Off Cardene to control BP wean of as tolerates to oral meds Not a surgical candidate for ICH with CLD with low plt Per cerebral angio: Small left parietal arteriovenous malformation with 8.6 x 3.9 mm nidus supplied by a posterior parietal branch of the left MCA and draining to a single superficial parietal cortical vein. Assessment & Plan:  
 
Acute metabolic encephalopathy due to acute left parietal intraparenchymal hemorrhage 
- non traumatic as suspected by NS and also etoh withdrawal 
- Seen by NSGY and requested eval from NIS 
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 - stable  
  
Possibly non traumatic ICH associated with brain compression and vasogenic edema  
-Neurosurgery following: no surgical intervention or steroids indicated 
-repeat CT stable 
-CTA head - 1. No interval change in size and appearance of large left posterior parietal 
parenchymal hematoma. Small approximate 1 cm tuft of vessels along the posterior superior margin of the hematoma. Possible vascular malformation No other vascular abnormalities demonstrated.  
4. No evidence of spots sign to suggest ongoing or progressive hemorrhage. 
-Continue keppra,  
  
UTI POA : Completed course 
  
Alcohol withdrawal 
-out of withdrawal window 
  
 Thrombocytopenia 
-Due likely to ETOH. Monitor  
-S/p 3 units of platelets. Plt > 100 
  
Hyponatremia due to alcohol: Resolved 
  
Code status: Full DVT prophylaxis: SCD Care Plan discussed with: Patient/Family and Nurse Disposition: TBD tele if no further NIS plan Hospital Problems  Date Reviewed: 11/9/2018 Codes Class Noted POA * (Principal) ICH (intracerebral hemorrhage) ([facility] [address] ICD-10-CM: I61.9 ICD-9-CM: 188  11/9/2018 Unknown Review of Systems:  
Review of systems not obtained due to patient factors. Vital Signs:  
 Last 24hrs VS reviewed since prior progress note. Most recent are: 
Visit Vitals /76 Pulse 74 Temp 98 °F (36.7 °C) Resp 15 Ht 5' 8\" (1.727 m) Wt 65.4 kg (144 lb 2.9 oz) SpO2 98% BMI 21.92 kg/m² Intake/Output Summary (Last 24 hours) at 11/17/2018 1116 Last data filed at 11/17/2018 0800 Gross per 24 hour Intake 240 ml Output 1700 ml Net -1460 ml Physical Examination:  
 
 
     
Constitutional:  No acute distress, some rash on chest   
ENT:  Oral mucous moist  
Resp:  CTA bilaterally. CV:  Regular rhythm, normal rate, GI:  Soft, non distended, non tender. bs= Musculoskeletal:  No edema, warm, 2+ pulses throughout Neurologic:  Moves all extremities. AAOx 3 Data Review:  
 I personally reviewed  Image and labs Labs:  
 
Recent Labs 11/15/18 
0434 WBC 10.7 HGB 13.3 HCT 39.3 * Recent Labs 11/15/18 
0434   
K 3.6  CO2 23 BUN 6  
CREA 0.52* GLU 99  
CA 8.4* MG 1.9 PHOS 3.7 Recent Labs 11/15/18 
0434 SGOT 51* ALT 37  
* TBILI 2.0*  
TP 8.7* ALB 3.0*  
GLOB 5.7* No results for input(s): INR, PTP, APTT in the last 72 hours. No lab exists for component: INREXT, INREXT No results for input(s): FE, TIBC, PSAT, FERR in the last 72 hours.   
No results found for: FOL, RBCF  
 No results for input(s): PH, PCO2, PO2 in the last 72 hours. No results for input(s): CPK, CKNDX, TROIQ in the last 72 hours. No lab exists for component: CPKMB Lab Results Component Value Date/Time Cholesterol, total 82 11/09/2018 05:23 PM  
 HDL Cholesterol 31 11/09/2018 05:23 PM  
 LDL, calculated 39.6 11/09/2018 05:23 PM  
 Triglyceride 57 11/09/2018 05:23 PM  
 CHOL/HDL Ratio 2.6 11/09/2018 05:23 PM  
 
No results found for: Cl Wilkinson Lab Results Component Value Date/Time Color ORANGE 11/09/2018 01:07 PM  
 Appearance CLOUDY (A) 11/09/2018 01:07 PM  
 Specific gravity 1.025 11/09/2018 01:07 PM  
 pH (UA) 6.5 11/09/2018 01:07 PM  
 Protein 300 (A) 11/09/2018 01:07 PM  
 Glucose NEGATIVE  11/09/2018 01:07 PM  
 Ketone 15 (A) 11/09/2018 01:07 PM  
 Urobilinogen >8.0 (H) 11/09/2018 01:07 PM  
 Nitrites POSITIVE (A) 11/09/2018 01:07 PM  
 Leukocyte Esterase SMALL (A) 11/09/2018 01:07 PM  
 Epithelial cells FEW 11/09/2018 01:07 PM  
 Bacteria NEGATIVE  11/09/2018 01:07 PM  
 WBC 5-10 11/09/2018 01:07 PM  
 RBC 20-50 11/09/2018 01:07 PM  
 
 
 
Medications Reviewed:  
 
Current Facility-Administered Medications Medication Dose Route Frequency  thiamine HCL (B-1) tablet 100 mg  100 mg Oral DAILY  folic acid (FOLVITE) tablet 1 mg  1 mg Oral DAILY  therapeutic multivitamin (THERAGRAN) tablet 1 Tab  1 Tab Oral DAILY  famotidine (PEPCID) tablet 20 mg  20 mg Oral BID  levETIRAcetam (KEPPRA) tablet 500 mg  500 mg Oral BID  niCARdipine (CARDENE) 25 mg in 0.9% sodium chloride 250 mL infusion  0-15 mg/hr IntraVENous TITRATE  hydrALAZINE (APRESOLINE) 20 mg/mL injection 20 mg  20 mg IntraVENous Q6H PRN  
 labetalol (NORMODYNE;TRANDATE) 5 mg/mL injection 20 mg  20 mg IntraVENous Q6H PRN  
 amLODIPine (NORVASC) tablet 10 mg  10 mg Oral DAILY  acetaminophen (TYLENOL) tablet 650 mg  650 mg Oral Q4H PRN Or  
 acetaminophen (TYLENOL) solution 650 mg  650 mg Per NG tube Q4H PRN  Or  
  acetaminophen (TYLENOL) suppository 650 mg  650 mg Rectal Q4H PRN  
 influenza vaccine 2018-19 (6 mos+)(PF) (FLUARIX QUAD/FLULAVAL QUAD) injection 0.5 mL  0.5 mL IntraMUSCular PRIOR TO DISCHARGE  ondansetron (ZOFRAN) injection 4 mg  4 mg IntraVENous Q6H PRN  
 naloxone (NARCAN) injection 0.4 mg  0.4 mg IntraVENous PRN  
 0.9% sodium chloride infusion 250 mL  250 mL IntraVENous PRN  
 SALINE PERIPHERAL FLUSH Q8H soln 5 mL  5 mL InterCATHeter Q8H  
 
______________________________________________________________________ EXPECTED LENGTH OF STAY: 3d 4h 
ACTUAL LENGTH OF STAY:          8 Colleen Bernardo MD

## 2018-11-18 PROCEDURE — 74011250637 HC RX REV CODE- 250/637: Performed by: INTERNAL MEDICINE

## 2018-11-18 PROCEDURE — 74011250637 HC RX REV CODE- 250/637: Performed by: NURSE PRACTITIONER

## 2018-11-18 PROCEDURE — 65660000000 HC RM CCU STEPDOWN

## 2018-11-18 RX ADMIN — FAMOTIDINE 20 MG: 20 TABLET ORAL at 17:06

## 2018-11-18 RX ADMIN — ACETAMINOPHEN 650 MG: 325 TABLET ORAL at 13:28

## 2018-11-18 RX ADMIN — Medication 100 MG: at 08:51

## 2018-11-18 RX ADMIN — Medication 5 ML: at 06:38

## 2018-11-18 RX ADMIN — Medication 5 ML: at 22:35

## 2018-11-18 RX ADMIN — Medication 5 ML: at 13:28

## 2018-11-18 RX ADMIN — FOLIC ACID 1 MG: 1 TABLET ORAL at 08:51

## 2018-11-18 RX ADMIN — LEVETIRACETAM 500 MG: 500 TABLET ORAL at 08:51

## 2018-11-18 RX ADMIN — FAMOTIDINE 20 MG: 20 TABLET ORAL at 08:51

## 2018-11-18 RX ADMIN — LEVETIRACETAM 500 MG: 500 TABLET ORAL at 17:06

## 2018-11-18 RX ADMIN — AMLODIPINE BESYLATE 10 MG: 5 TABLET ORAL at 08:51

## 2018-11-18 RX ADMIN — THERA TABS 1 TABLET: TAB at 08:51

## 2018-11-18 NOTE — PROGRESS NOTES
Hospitalist Progress Note Derik Lockett MD 
Answering service: [phone] OR [phone] from in house phone Date of Service:  2018 NAME:  Sneha Giron 
:  1980 MRN:  [id number] Admission Summary:  
 
Patient is a 41year old  man who is heavy alcoholic brought to the ER for confusion. He had fallen the night prior. He was admitted to the ICU for ICH and alcohol withdrawal.  
 
 
Interval history / Subjective:  
  
 
Per cerebral angio: Small left parietal arteriovenous malformation Will d/w NSGY as recommended by Dr. Sandy Phi Pt probably will need rehab Assessment & Plan:  
 
Acute metabolic encephalopathy due to acute left parietal intraparenchymal hemorrhage 
- non traumatic as suspected by NS and also etoh withdrawal 
- Seen by NSGY and requested eval from NIS 
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 - stable need NSGY evaluation 
  
Possibly non traumatic ICH associated with brain compression and vasogenic edema  
-Neurosurgery following: no surgical intervention or steroids indicated 
-repeat CT stable 
-CTA head - 1. No interval change in size and appearance of large left posterior parietal 
parenchymal hematoma. Small approximate 1 cm tuft of vessels along the posterior superior margin of the hematoma. Possible vascular malformation No other vascular abnormalities demonstrated. 4. No evidence of spots sign to suggest ongoing or progressive hemorrhage. 
-Continue keppra,  
  
UTI POA : Completed course 
  
Alcohol withdrawal 
-out of withdrawal window 
  Thrombocytopenia 
-Due likely to ETOH. Monitor  
-S/p 3 units of platelets. Plt > 100 
  
Hyponatremia due to alcohol: Resolved 
  
Code status: Full DVT prophylaxis: SCD 
 
 Care Plan discussed with: Patient/Family and Nurse Disposition: TBD Rehab vs home PT/OT Hospital Problems  Date Reviewed: 11/9/2018 Codes Class Noted POA * (Principal) [facility] ICD-10-CM: I61.9 ICD-9-CM: 608  11/9/2018 Unknown Review of Systems:  
Review of systems not obtained due to patient factors. Vital Signs:  
 Last 24hrs VS reviewed since prior progress note. Most recent are: 
Visit Vitals /65 Pulse 67 Temp 98.7 °F (37.1 °C) Resp 16 Ht 5' 8\" (1.727 m) Wt 80.7 kg (177 lb 14.6 oz) SpO2 97% BMI 27.05 kg/m² Intake/Output Summary (Last 24 hours) at 11/18/2018 8186 Last data filed at 11/17/2018 0800 Gross per 24 hour Intake 240 ml Output  Net 240 ml Physical Examination:  
 
 
     
Constitutional:  No acute distress, some rash on chest   
ENT:  Oral mucous moist  
Resp:  CTA bilaterally. CV:  Regular rhythm, normal rate, GI:  Soft, non distended, non tender. bs= Musculoskeletal:  No edema, warm, 2+ pulses throughout Neurologic:  Moves all extremities. AAOx 3 Data Review:  
 I personally reviewed  Image and labs Labs:  
 
No results for input(s): WBC, HGB, HCT, PLT, HGBEXT, HCTEXT, PLTEXT, HGBEXT, HCTEXT, PLTEXT in the last 72 hours. No results for input(s): NA, K, CL, CO2, BUN, CREA, GLU, CA, MG, PHOS, URICA in the last 72 hours. No results for input(s): SGOT, GPT, ALT, AP, TBIL, TBILI, TP, ALB, GLOB, GGT, AML, LPSE in the last 72 hours. No lab exists for component: AMYP, HLPSE No results for input(s): INR, PTP, APTT in the last 72 hours. No lab exists for component: INREXT, INREXT No results for input(s): FE, TIBC, PSAT, FERR in the last 72 hours. No results found for: FOL, RBCF No results for input(s): PH, PCO2, PO2 in the last 72 hours. No results for input(s): CPK, CKNDX, TROIQ in the last 72 hours. No lab exists for component: CPKMB Lab Results Component Value Date/Time Cholesterol, total 82 11/09/2018 05:23 PM  
 HDL Cholesterol 31 11/09/2018 05:23 PM  
 LDL, calculated 39.6 11/09/2018 05:23 PM  
 Triglyceride 57 11/09/2018 05:23 PM  
 CHOL/HDL Ratio 2.6 11/09/2018 05:23 PM  
 
No results found for: Aniket Borne Lab Results Component Value Date/Time Color ORANGE 11/09/2018 01:07 PM  
 Appearance CLOUDY (A) 11/09/2018 01:07 PM  
 Specific gravity 1.025 11/09/2018 01:07 PM  
 pH (UA) 6.5 11/09/2018 01:07 PM  
 Protein 300 (A) 11/09/2018 01:07 PM  
 Glucose NEGATIVE  11/09/2018 01:07 PM  
 Ketone 15 (A) 11/09/2018 01:07 PM  
 Urobilinogen >8.0 (H) 11/09/2018 01:07 PM  
 Nitrites POSITIVE (A) 11/09/2018 01:07 PM  
 Leukocyte Esterase SMALL (A) 11/09/2018 01:07 PM  
 Epithelial cells FEW 11/09/2018 01:07 PM  
 Bacteria NEGATIVE  11/09/2018 01:07 PM  
 WBC 5-10 11/09/2018 01:07 PM  
 RBC 20-50 11/09/2018 01:07 PM  
 
 
 
Medications Reviewed:  
 
Current Facility-Administered Medications Medication Dose Route Frequency  thiamine HCL (B-1) tablet 100 mg  100 mg Oral DAILY  folic acid (FOLVITE) tablet 1 mg  1 mg Oral DAILY  therapeutic multivitamin (THERAGRAN) tablet 1 Tab  1 Tab Oral DAILY  famotidine (PEPCID) tablet 20 mg  20 mg Oral BID  levETIRAcetam (KEPPRA) tablet 500 mg  500 mg Oral BID  niCARdipine (CARDENE) 25 mg in 0.9% sodium chloride 250 mL infusion  0-15 mg/hr IntraVENous TITRATE  hydrALAZINE (APRESOLINE) 20 mg/mL injection 20 mg  20 mg IntraVENous Q6H PRN  
 labetalol (NORMODYNE;TRANDATE) 5 mg/mL injection 20 mg  20 mg IntraVENous Q6H PRN  
 amLODIPine (NORVASC) tablet 10 mg  10 mg Oral DAILY  acetaminophen (TYLENOL) tablet 650 mg  650 mg Oral Q4H PRN Or  
 acetaminophen (TYLENOL) solution 650 mg  650 mg Per NG tube Q4H PRN  Or  
 acetaminophen (TYLENOL) suppository 650 mg  650 mg Rectal Q4H PRN  
 influenza vaccine 2018-19 (6 mos+)(PF) (FLUARIX QUAD/FLULAVAL QUAD) injection 0.5 mL  0.5 mL IntraMUSCular PRIOR TO DISCHARGE  ondansetron (ZOFRAN) injection 4 mg  4 mg IntraVENous Q6H PRN  
 naloxone (NARCAN) injection 0.4 mg  0.4 mg IntraVENous PRN  
 0.9% sodium chloride infusion 250 mL  250 mL IntraVENous PRN  
 SALINE PERIPHERAL FLUSH Q8H soln 5 mL  5 mL InterCATHeter Q8H  
 
______________________________________________________________________ EXPECTED LENGTH OF STAY: 3d 4h 
ACTUAL LENGTH OF STAY:          9 Henna Blevins MD

## 2018-11-18 NOTE — PROGRESS NOTES
Problem: Falls - Risk of 
Goal: *Absence of Falls Document Rosalia Gregory Fall Risk and appropriate interventions in the flowsheet. Outcome: Progressing Towards Goal 
Fall Risk Interventions: 
Mobility Interventions: Communicate number of staff needed for ambulation/transfer, OT consult for ADLs, Patient to call before getting OOB, PT Consult for mobility concerns, PT Consult for assist device competence Mentation Interventions: Door open when patient unattended, Increase mobility, More frequent rounding, Reorient patient, Room close to nurse's station Medication Interventions: Evaluate medications/consider consulting pharmacy, Patient to call before getting OOB, Teach patient to arise slowly Elimination Interventions: Call light in reach, Patient to call for help with toileting needs, Toilet paper/wipes in reach, Toileting schedule/hourly rounds History of Falls Interventions: Evaluate medications/consider consulting pharmacy, Investigate reason for fall, Room close to nurse's station Problem: Pressure Injury - Risk of 
Goal: *Prevention of pressure injury Document Jose Maria Scale and appropriate interventions in the flowsheet. Outcome: Progressing Towards Goal 
Pressure Injury Interventions: 
Sensory Interventions: Float heels, Keep linens dry and wrinkle-free, Maintain/enhance activity level, Minimize linen layers Moisture Interventions: Limit adult briefs, Maintain skin hydration (lotion/cream), Minimize layers Activity Interventions: Increase time out of bed, Pressure redistribution bed/mattress(bed type), PT/OT evaluation Mobility Interventions: HOB 30 degrees or less, Pressure redistribution bed/mattress (bed type), PT/OT evaluation Nutrition Interventions: Document food/fluid/supplement intake, Discuss nutritional consult with provider Friction and Shear Interventions: HOB 30 degrees or less, Lift sheet

## 2018-11-19 VITALS
WEIGHT: 181.88 LBS | DIASTOLIC BLOOD PRESSURE: 67 MMHG | TEMPERATURE: 97.9 F | HEART RATE: 88 BPM | HEIGHT: 68 IN | RESPIRATION RATE: 15 BRPM | OXYGEN SATURATION: 100 % | SYSTOLIC BLOOD PRESSURE: 121 MMHG | BODY MASS INDEX: 27.57 KG/M2

## 2018-11-19 PROBLEM — I61.9 ICH (INTRACEREBRAL HEMORRHAGE) (HCC): Status: RESOLVED | Noted: 2018-11-09 | Resolved: 2018-11-19

## 2018-11-19 LAB
ANION GAP SERPL CALC-SCNC: 10 MMOL/L (ref 5–15)
BUN SERPL-MCNC: 9 MG/DL (ref 6–20)
BUN/CREAT SERPL: 13 (ref 12–20)
CALCIUM SERPL-MCNC: 8.3 MG/DL (ref 8.5–10.1)
CHLORIDE SERPL-SCNC: 99 MMOL/L (ref 97–108)
CO2 SERPL-SCNC: 26 MMOL/L (ref 21–32)
CREAT SERPL-MCNC: 0.68 MG/DL (ref 0.7–1.3)
GLUCOSE SERPL-MCNC: 97 MG/DL (ref 65–100)
POTASSIUM SERPL-SCNC: 4 MMOL/L (ref 3.5–5.1)
SODIUM SERPL-SCNC: 135 MMOL/L (ref 136–145)

## 2018-11-19 PROCEDURE — 97116 GAIT TRAINING THERAPY: CPT

## 2018-11-19 PROCEDURE — 80048 BASIC METABOLIC PNL TOTAL CA: CPT

## 2018-11-19 PROCEDURE — 74011250637 HC RX REV CODE- 250/637: Performed by: NURSE PRACTITIONER

## 2018-11-19 PROCEDURE — 97110 THERAPEUTIC EXERCISES: CPT

## 2018-11-19 PROCEDURE — 94760 N-INVAS EAR/PLS OXIMETRY 1: CPT

## 2018-11-19 PROCEDURE — 90471 IMMUNIZATION ADMIN: CPT

## 2018-11-19 PROCEDURE — 74011250636 HC RX REV CODE- 250/636: Performed by: HOSPITALIST

## 2018-11-19 PROCEDURE — 90686 IIV4 VACC NO PRSV 0.5 ML IM: CPT | Performed by: HOSPITALIST

## 2018-11-19 PROCEDURE — 92507 TX SP LANG VOICE COMM INDIV: CPT | Performed by: SPEECH-LANGUAGE PATHOLOGIST

## 2018-11-19 PROCEDURE — 74011250637 HC RX REV CODE- 250/637: Performed by: INTERNAL MEDICINE

## 2018-11-19 PROCEDURE — 97535 SELF CARE MNGMENT TRAINING: CPT

## 2018-11-19 PROCEDURE — 36415 COLL VENOUS BLD VENIPUNCTURE: CPT

## 2018-11-19 RX ORDER — LEVETIRACETAM 500 MG/1
500 TABLET ORAL 2 TIMES DAILY
Qty: 60 TAB | Refills: 0 | Status: SHIPPED | OUTPATIENT
Start: 2018-11-19 | End: 2018-12-19

## 2018-11-19 RX ORDER — AMLODIPINE BESYLATE 10 MG/1
10 TABLET ORAL DAILY
Qty: 30 TAB | Refills: 0 | Status: SHIPPED | OUTPATIENT
Start: 2018-11-20 | End: 2018-12-20

## 2018-11-19 RX ADMIN — Medication 5 ML: at 07:22

## 2018-11-19 RX ADMIN — FOLIC ACID 1 MG: 1 TABLET ORAL at 08:45

## 2018-11-19 RX ADMIN — INFLUENZA VIRUS VACCINE 0.5 ML: 15; 15; 15; 15 SUSPENSION INTRAMUSCULAR at 12:36

## 2018-11-19 RX ADMIN — AMLODIPINE BESYLATE 10 MG: 5 TABLET ORAL at 08:45

## 2018-11-19 RX ADMIN — FAMOTIDINE 20 MG: 20 TABLET ORAL at 08:45

## 2018-11-19 RX ADMIN — Medication 100 MG: at 08:45

## 2018-11-19 RX ADMIN — LEVETIRACETAM 500 MG: 500 TABLET ORAL at 08:46

## 2018-11-19 RX ADMIN — THERA TABS 1 TABLET: TAB at 08:45

## 2018-11-19 NOTE — PROGRESS NOTES
Bedside shift change report given to Siva Chisholm (oncoming nurse) by Christal Morris (offgoing nurse). Report included the following information SBAR, Kardex, Procedure Summary, MAR, Accordion, Recent Results and Cardiac Rhythm NSR.

## 2018-11-19 NOTE — PROGRESS NOTES
Neurosurgery progress note Discussed with patient importance of f/u with Dr. Dwayne Marcelino to discuss treatment of the AVM with surgery versus gamma knife treatment. I placed appt time and date on the patient's AVS at discharge. I have also given the patient follow-up information to meet with Dr. Meryl Santoyo in 01/2019 to discuss possible gamma knife treatment. Discussed with Dr. Brianna Almazan.   
 
Guille Aviles NP

## 2018-11-19 NOTE — DISCHARGE INSTRUCTIONS
Discharge Instructions       PATIENT ID: Alex Lainez  MRN: [id number]   YOB: 1980    DATE OF ADMISSION: 11/9/2018  3:01 PM    DATE OF DISCHARGE: 11/19/2018    PRIMARY CARE PROVIDER: None     ATTENDING PHYSICIAN: Eric Rashid MD  DISCHARGING PROVIDER: Henna Blevins MD    To contact this individual call [phone] and ask the  to page. If unavailable ask to be transferred the Adult Hospitalist Department. DISCHARGE DIAGNOSES ICH and AVM     CONSULTATIONS: IP CONSULT TO NEUROSURGERY  IP CONSULT TO HOSPITALIST  IP CONSULT TO NEUROINTERVENTIONAL SURGERY    PROCEDURES/SURGERIES: * No surgery found *    PENDING TEST RESULTS:   At the time of discharge the following test results are still pending:     FOLLOW UP APPOINTMENTS:   Follow-up Information     Follow up With Specialties Details Why Contact Info    Anne Cardoso MD Neurosurgery Schedule an appointment as soon as possible for a visit in 2 months to discuss gamma knife treatment to AVM [address] [place] [address] [address] PCP appointment on Monday Nvember 26,2018 @ 1:30 p.m. 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 · Keep your medication in the bottles provided by the pharmacist and keep a list of the medication names, dosages, and times to be taken in your wallet. · Do not take other medications without consulting your doctor. NOTIFY YOUR PHYSICIAN FOR ANY OF THE FOLLOWING:   Fever over 101 degrees for 24 hours.    Chest pain, shortness of breath, fever, chills, nausea, vomiting, diarrhea, change in mentation, falling, weakness, bleeding. Severe pain or pain not relieved by medications. Or, any other signs or symptoms that you may have questions about.       DISPOSITION:  x  Home With:   OT x PT x HH  RN       SNF/Inpatient Rehab/LTAC    Independent/assisted living    Hospice    Other:     CDMP Checked:   Yes x     PROBLEM LIST Updated:  Yes x       Signed:   Ace Isabel MD  11/19/2018  11:39 AM

## 2018-11-19 NOTE — PROGRESS NOTES
Problem: Communication Impaired (Adult) Goal: *Acute Goals and Plan of Care (Insert Text) Speech pathology goals initiated 11/14/2018 1. Patient will respond to simple/personal yes/no questions with 85% accuracy given no more than 1 repetition within 7 days 2. Patient will follow 1-step commands with 90% accuracy given no more than 1 repetition within 7 days 3. Patient will complete 1-word sentence completions with 75% accuracy given moderate cues within 7 days Speech language pathology treatment Patient: Courtney Gil (87 y.o. male) Date: 11/19/2018 Diagnosis: ICH (intracerebral hemorrhage) ([facility]) ICH (intracerebral hemorrhage) ([facility] [address] ASSESSMENT: 
Patient presents with impaired mild-moderately receptive language deficits and expressive language deficits. Suspect language barrier plays some role in language deficits. Patient able to express himself in sentences at times. Recommend continue SLP treatment for language treatment. Progression toward goals: 
[x]       Improving appropriately and progressing toward goals 
[]       Improving slowly and progressing toward goals 
[]       Not making progress toward goals and plan of care will be adjusted PLAN: 
Patient continues to benefit from skilled intervention to address the above impairments. Continue treatment per established plan of care. Discharge Recommendations: To Be Determined SUBJECTIVE:  
Patient stated I called my sister. She should be here in about 25 minutes.   RN reports no concerns. Patient agreed to session. Structured treatment provided in Khmer as Brigid Sandra is his second language. Carmina Yen OBJECTIVE:  
Mental Status: 
Neurologic State: Alert Orientation Level: Oriented X4 Cognition: Appropriate decision making, Appropriate for age attention/concentration, Appropriate safety awareness, Follows commands Perception: Cues to attend right visual field, Cues to attend to right side of body, Cues to maintain midline in standing, Tactile, Verbal 
Perseveration: No perseveration noted Safety/Judgement: Decreased awareness of environment, Decreased awareness of need for assistance, Decreased awareness of need for safety, Decreased insight into deficits, Fall prevention Treatment & Interventions: 
Language Comprehension and Expression: Auditory Comprehension Response to Basic Yes/No Questions (%): 60 % One-Step Basic Commands (%): 67 % Verbal Expression Initiation: No impairment Repetition: No impairment Confrontation (%): 70 % After treatment:  
[x]       Patient left in no apparent distress sitting up in chair 
[]       Patient left in no apparent distress in bed 
[x]       Call bell left within reach [x]       Nursing notified 
[]       Caregiver present 
[]       Bed alarm activated COMMUNICATION/COLLABORATION:  
Patient was educated regarding role of SLP. Patient nodded. The patients plan of care was discussed with: Registered Nurse Augie Blackburn SLP Time Calculation: 25 mins

## 2018-11-19 NOTE — PROGRESS NOTES
Problem: Self Care Deficits Care Plan (Adult) Goal: *Acute Goals and Plan of Care (Insert Text) Occupational Therapy Goals Initiated 11/13/2018 1. Patient will perform self-feeding using LUE and RUE as gross motor stabilizer with moderate assistance within 7 day(s). 2.  Patient will perform upper body ADLs using LUE and RUE as gross motor stabilizer with moderate assistance within 7 day(s). 3.  Patient will perform lower body ADLs using LUE and RUE as gross motor stabilizer with moderate assistance within 7 day(s). 4.  Patient will perform toilet transfers to [facility] with moderate assistance within 7 day(s). 5.  Patient will perform all aspects of toileting with moderate assistancewithin 7 day(s). 6.  Patient will participate in upper extremity therapeutic exercise/activities with moderate assistance  for 5 minutes within 7 day(s). 7.  Patient will utilize energy conservation techniques during functional activities with verbal and tactile cues within 7 day(s). 8.  Patient will improve their Fugl Almaguer score by 5 points in prep for ADLs within 7 days. Occupational Therapy TREATMENT Patient: Riccardo Donald (87 y.o. male) Date: 11/19/2018 Diagnosis: ICH (intracerebral hemorrhage) ([facility]) ICH (intracerebral hemorrhage) ([facility] [address] Precautions: Fall, Bed Alarm Chart, occupational therapy assessment, plan of care, and goals were reviewed. ASSESSMENT: 
Patient cleared by RN to be seen for OT treatment, patient received in chair and agreeable to treatment. Patient with CGA-SBA to complete functional mobility to/from bathroom. Patient completed toileting with SBA using grab bar for balance. Patient completed grooming at sink with multimodal cues to use RUE as GM stabilizer. Patient needs continued VCs for use RUE during ADL tasks.  Patient given green resistive sponge and handout with activities and exercises to completed using R hand to improve strength and dexterity with patient demonstrating fair ability to complete. Patient will require 24/7 supervision and HHOT/PT once D/C'd home 2* R inattention, mildly impaired balance, decreased sensation in RUE and decreased safety awareness/insight into deficits. Progression toward goals: 
[]       Improving appropriately and progressing toward goals [x]       Improving slowly and progressing toward goals 
[]       Not making progress toward goals and plan of care will be adjusted PLAN: 
Patient continues to benefit from skilled intervention to address the above impairments. Continue treatment per established plan of care. Discharge Recommendations:  Home Health OT and 24/7 supervision/assistance Further Equipment Recommendations for Discharge:  none SUBJECTIVE:  
Patient stated It feels the same.  OBJECTIVE DATA SUMMARY:  
Cognitive/Behavioral Status: 
Neurologic State: Alert Orientation Level: Oriented X4 Cognition: Appropriate for age attention/concentration; Follows commands Perception: Cues to attend to right side of body; Tactile;Verbal 
Perseveration: No perseveration noted Safety/Judgement: Decreased awareness of environment;Decreased awareness of need for safety;Decreased insight into deficits; Fall prevention Functional Mobility and Transfers for ADLs:Bed Mobility: 
Supine to Sit: Supervision Transfers: 
Sit to Stand: Stand-by assistance Functional Transfers Toilet Transfer : Contact guard assistance Balance: 
Sitting: Intact Sitting - Static: Good (unsupported) Sitting - Dynamic: Good (unsupported) Standing: Impaired Standing - Static: Good Standing - Dynamic : Fair ADL Intervention:Patient educated on safety awareness (hot/cold, sharp objects, etc). needed fro RUE 2* decreased sensation, patient verbalized understanding. Patient required VCs to use RUE as GM stabilizer and incorporate using RUE into ADL tasks. Grooming Brushing Teeth: Contact guard assistance;Training to use affected extremity as a gross motor assistance(standing at sink ) Cues: Tactile cues provided;Verbal cues provided Toileting Bladder Hygiene: Stand-by assistance Bowel Hygiene: Stand-by assistance Clothing Management: Stand-by assistance Adaptive Equipment: Grab bars Cognitive Retraining Safety/Judgement: Decreased awareness of environment;Decreased awareness of need for safety;Decreased insight into deficits; Fall prevention Therapeutic Exercises:  
Patient issued green resistive sponge with patient demonstrating ability to complete 1x10 squeezes with R hand. Patient noted to have improved  strength (3/5) in R hand. Patient issued handout for activities to do at home in order to improve dexterity in R hand with good carryover. Pain: 
Pain Scale 1: Numeric (0 - 10) Pain Intensity 1: 0 Activity Tolerance:  
Good, VSS Please refer to the flowsheet for vital signs taken during this treatment. After treatment:  
[x] Patient left in no apparent distress sitting up in chair 
[] Patient left in no apparent distress in bed 
[x] Call bell left within reach [x] Nursing notified 
[] Caregiver present [x] Chair alarm activated COMMUNICATION/COLLABORATION:  
The patients plan of care was discussed with: Physical Therapist and Registered Nurse Katie Montano OT Time Calculation: 31 mins

## 2018-11-19 NOTE — DISCHARGE SUMMARY
Discharge Summary       PATIENT ID: Ese Neff  MRN: [id number]   YOB: 1980    DATE OF ADMISSION: 11/9/2018  3:01 PM    DATE OF DISCHARGE: 11/19/18   PRIMARY CARE PROVIDER: None     ATTENDING PHYSICIAN: Clint Souza  DISCHARGING PROVIDER: Clint Souza MD    To contact this individual call [phone] and ask the  to page. If unavailable ask to be transferred the Adult Hospitalist Department. CONSULTATIONS: IP CONSULT TO NEUROSURGERY  IP CONSULT TO HOSPITALIST  IP CONSULT TO NEUROINTERVENTIONAL SURGERY    PROCEDURES/SURGERIES: * No surgery found *    ADMITTING DIAGNOSES & HOSPITAL COURSE:   Patient is a 41year old  man who is heavy alcoholic brought to the ER for confusion. He had fallen the night prior. He was admitted to the ICU for ICH and alcohol withdrawal.                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 - stable f/u out pt with NSGY for GK     Possibly non traumatic ICH associated with brain compression and vasogenic edema   -Neurosurgery following: no surgical intervention or steroids indicated  -repeat CT stable  -CTA head - 1. No interval change in size and appearance of large left posterior parietal  parenchymal hematoma. Small approximate 1 cm tuft of vessels along the posterior superior margin of the hematoma. Possible vascular malformation No other vascular abnormalities demonstrated.   4. No evidence of spots sign to suggest ongoing or progressive hemorrhage.  -Continue keppra,      UTI POA : Completed course     Alcohol withdrawal  -out of withdrawal window     Thrombocytopenia  -Due likely to ETOH. Monitor   -S/p 3 units of platelets. Plt > 100     Hyponatremia due to alcohol: Resolved           PENDING TEST RESULTS:   At the time of discharge the following test results are still pending:     FOLLOW UP APPOINTMENTS:    Follow-up Information     Follow up With Specialties Details Why Contact Bennett Del Castillo MD Neurosurgery Schedule an appointment as soon as possible for a visit in 2 months to discuss gamma knife treatment to  [address] PCP appointment on Monday Nvember 26,2018 @ 1:30 p.m. [address] [address]    None    None (395) Patient stated that they have no PCP      Rosendo Black MD Neurosurgery Go on 11/29/2018 arrive at 10:30 for 11:00 appt to discuss AVM treatment - surgery versus gamma knife [address] [facility]  [phone]             ADDITIONAL CARE RECOMMENDATIONS:     DIET: Regular Diet and Cardiac Diet    ACTIVITY: Activity as tolerated    WOUND CARE:     EQUIPMENT needed:       DISCHARGE MEDICATIONS:  Current Discharge Medication List      START taking these medications    Details   amLODIPine (NORVASC) 10 mg tablet Take 1 Tab by mouth daily for 30 days. Qty: 30 Tab, Refills: 0      keppra  500 mg  X BID 30 days          NOTIFY YOUR PHYSICIAN FOR ANY OF THE FOLLOWING:   Fever over 101 degrees for 24 hours. Chest pain, shortness of breath, fever, chills, nausea, vomiting, diarrhea, change in mentation, falling, weakness, bleeding. Severe pain or pain not relieved by medications. Or, any other signs or symptoms that you may have questions about.     DISPOSITION:  x  Home With:  x OT x PT x HH  RN       Long term SNF/Inpatient Rehab    Independent/assisted living    Hospice    Other:       PATIENT CONDITION AT DISCHARGE:     Functional status    Poor    x Deconditioned     Independent Cognition    x Lucid     Forgetful     Dementia      Catheters/lines (plus indication)    Stokes     PICC     PEG    x None      Code status   x  Full code     DNR      PHYSICAL EXAMINATION AT DISCHARGE:                                                Constitutional:  No acute distress, some rash on chest    ENT:  Oral mucous moist   Resp:  CTA bilaterally. CV:  Regular rhythm, normal rate,    GI:  Soft, non distended, non tender. bs=    Musculoskeletal:  No edema, warm, 2+ pulses throughout    Neurologic:  Moves all extremities.   AAOx 3                                     CHRONIC MEDICAL DIAGNOSES:  Problem List as of 11/19/2018 Date Reviewed: 11/19/2018          Codes Class Noted - Resolved    * (Principal) RESOLVED: [facility] ICD-10-CM: I61.9  ICD-9-CM: 530  11/9/2018 - 11/19/2018              Greater than 30  minutes were spent with the patient on counseling and coordination of care    Signed:   Sara Flores MD  11/19/2018  12:08 PM

## 2018-11-19 NOTE — PROGRESS NOTES
Problem: Mobility Impaired (Adult and Pediatric) Goal: *Acute Goals and Plan of Care (Insert Text) Physical Therapy Goals Initiated 11/13/2018 1. Patient will move from supine to sit and sit to supine , scoot up and down and roll side to side in bed with supervision/set-up within 7 day(s). 2.  Patient will transfer from bed to chair and chair to bed with minimal assistance/contact guard assist using the least restrictive device within 7 day(s). 3.  Patient will perform sit to stand with minimal assistance/contact guard assist within 7 day(s). 4.  Patient will ambulate with minimal assistance/contact guard assist for 50 feet with the least restrictive device within 7 day(s). 6.  Patient will complete Alcazar Balance Test within 7 days. physical Therapy TREATMENT Patient: Tal Lawrence (43 y.o. male) Date: 11/19/2018 Diagnosis: ICH (intracerebral hemorrhage) ([facility]) ICH (intracerebral hemorrhage) ([facility] [address] Precautions: Fall, Bed Alarm Chart, physical therapy assessment, plan of care and goals were reviewed. ASSESSMENT: 
 
Discharge rec - HHPT with 24/7 supervision vs rehab Barriers for discharge home- limited support at home, poor safety awareness, impaired dynamic standing balance, RUE deficits/limitations affecting ADLs Chart reviewed, RN cleared patient for mobility, and patient received in bed agreeable to participate with bed alarm activated. Patient showed significant progress today, completing all mobility with SUP/CGA over the course of the session including bed mobility, transfers, and gait training 275ft. PT reinforced mobility expectations today (ambulating 3x with RN/PCT) with strict education on using call bell to not walk alone. Patient demos fair retention of education with readback, PT reiterated again and patient nodded in understanding.  Patient ended session in chair with alarm activated, all needs placed within reach, and RN, OT, and CM notified of patient's progress. Discharge rec - HHPT with 24/7 supervision vs rehab Progression toward goals: 
[]    Improving appropriately and progressing toward goals [x]    Improving slowly and progressing toward goals 
[]    Not making progress toward goals and plan of care will be adjusted PLAN: 
Patient continues to benefit from skilled intervention to address the above impairments. Continue treatment per established plan of care. Discharge Recommendations:  Home Health with 24/7 sup vs rehab Further Equipment Recommendations for Discharge:  TBD SUBJECTIVE:  
Patient stated No Im not dizzy.  OBJECTIVE DATA SUMMARY:  
Critical Behavior: 
Neurologic State: Alert Orientation Level: Oriented to person, Oriented to place, Oriented to situation, Disoriented to time Cognition: Follows commands Safety/Judgement: Decreased awareness of environment, Decreased awareness of need for assistance, Decreased awareness of need for safety, Decreased insight into deficits, Fall prevention Functional Mobility Training: 
Bed Mobility: 
  
Supine to Sit: Supervision Transfers: 
Sit to Stand: Stand-by assistance Stand to Sit: Stand-by assistance Stand Pivot Transfers: Contact guard assistance Balance: 
Sitting: Intact Sitting - Static: Good (unsupported) Sitting - Dynamic: Good (unsupported) Standing: Impaired Standing - Static: Good Standing - Dynamic : FairAmbulation/Gait Training: 
Distance (ft): 275 Feet (ft) Assistive Device: Gait belt Ambulation - Level of Assistance: Contact guard assistance(progressed from B HHA to CGA on gait belt) Gait Abnormalities: Decreased step clearance;Trunk sway increased Base of Support: Narrowed Speed/Em: Pace decreased (<100 feet/min) Step Length: Left shortened;Right shortened Stairs: 
  
  
   
 
Neuro Re-Education: 
 
Therapeutic Exercises:  
 
Pain: Pain Scale 1: Numeric (0 - 10) Pain Intensity 1: 0 Activity Tolerance:  
Good, steady gait without external support, improving mobility Please refer to the flowsheet for vital signs taken during this treatment. After treatment:  
[x]    Patient left in no apparent distress sitting up in chair 
[]    Patient left in no apparent distress in bed 
[x]    Call bell left within reach [x]    Nursing notified 
[]    Caregiver present [x]    Bed alarm activated COMMUNICATION/COLLABORATION:  
The patients plan of care was discussed with: Occupational Therapist, Registered Nurse and Care Manager Lisbet Xavier PT, DPT Time Calculation: 12 mins

## 2018-11-19 NOTE — PROGRESS NOTES
New PCP appointment on Monday [facility] 26,2018 @ 1:30 p.m, with [facility]. Added to AVS. Ti Watts,

## 2018-11-19 NOTE — PROGRESS NOTES
Courtney Rounded on pt. Pt stated that he was anxious to go home. Left my telephone number to assist with discharge instructions. Naima Soto CMI, Certified

## 2018-11-19 NOTE — PROGRESS NOTES
BLESSING received home health orders for this pt and sent them to [facility], as this pt does not have insurance. CM specialist did set pt up with a new [facility] appt for next Monday. CM received a call from [facility] stating that she is unable to accept this pt because they \" are full\" and \" do not have the capacity to take care of this pt's home health needs\". CM messaged attending to inform him and he still wants to proceed with d/c knowing that pt will not have follow up therapy. BLESSING met with pt and his aunt, Ashok Boles. Cm introduced them to  services. Per pt, he is not in the [address] legally and gets paid 'under the table\". BLESSING gave pt/aunt the [facility] application in [place] and informed them about next [facility] appointment. Aunt verbalized understanding. Blessing also informed them that BLESSING was unsuccessful in finding a home health agency to accept this pt for home care. Mary Spence